# Patient Record
Sex: FEMALE | Race: WHITE | NOT HISPANIC OR LATINO | Employment: OTHER | URBAN - METROPOLITAN AREA
[De-identification: names, ages, dates, MRNs, and addresses within clinical notes are randomized per-mention and may not be internally consistent; named-entity substitution may affect disease eponyms.]

---

## 2017-01-04 ENCOUNTER — OFFICE VISIT (OUTPATIENT)
Dept: NEUROLOGY | Facility: MEDICAL CENTER | Age: 82
End: 2017-01-04
Payer: COMMERCIAL

## 2017-01-04 VITALS
WEIGHT: 130 LBS | BODY MASS INDEX: 24.55 KG/M2 | SYSTOLIC BLOOD PRESSURE: 142 MMHG | OXYGEN SATURATION: 92 % | HEART RATE: 84 BPM | TEMPERATURE: 98.1 F | DIASTOLIC BLOOD PRESSURE: 70 MMHG | HEIGHT: 61 IN

## 2017-01-04 DIAGNOSIS — G45.8 OTHER SPECIFIED TRANSIENT CEREBRAL ISCHEMIAS: ICD-10-CM

## 2017-01-04 PROCEDURE — G8427 DOCREV CUR MEDS BY ELIG CLIN: HCPCS

## 2017-01-04 PROCEDURE — 1036F TOBACCO NON-USER: CPT

## 2017-01-04 PROCEDURE — 99204 OFFICE O/P NEW MOD 45 MIN: CPT

## 2017-01-04 PROCEDURE — G8420 CALC BMI NORM PARAMETERS: HCPCS

## 2017-01-04 RX ORDER — PIOGLITAZONEHYDROCHLORIDE 30 MG/1
30 TABLET ORAL DAILY
COMMUNITY
End: 2017-07-28

## 2017-01-04 RX ORDER — ALENDRONATE SODIUM 70 MG/1
70 TABLET ORAL
COMMUNITY

## 2017-01-04 RX ORDER — GLIPIZIDE 5 MG/1
5 TABLET ORAL EVERY MORNING
COMMUNITY

## 2017-01-04 RX ORDER — CARVEDILOL 3.12 MG/1
3.12 TABLET ORAL 2 TIMES DAILY WITH MEALS
COMMUNITY
End: 2017-07-28

## 2017-01-04 RX ORDER — PRAVASTATIN SODIUM 40 MG
40 TABLET ORAL NIGHTLY
COMMUNITY

## 2017-01-04 NOTE — PROGRESS NOTES
Neurology Consult Note  1/4/2017      Referring MD:  Dr. Mark Sommers    Patient ID:  Name:             Deanna Ram   YOB: 1931  Age:                 85 y.o.  female   MRN:               [id number]                                              Reason for Consult:      Evaluation of a possible transient ischemic attack    History of Present Illness:    This 85-year-old lady had an episode perhaps 4 years ago in which she seemed to be confused and lasted for an hour or so and she may have had some difficulty speaking during that period of time. She did have a complete workup in a local facility and was found to have approximately 70% stenosis in the right carotid less than 50% on the left and had brain imaging study of some sort possibly CAT scan or an MRI. She had been on 81 mg aspirin prior to that and the dose was increased to a full aspirin sometime after that event. In June 2016 she had another event that was relatively brief and apparently in the same nature with brief period of confusion possibly some difficulty speaking which cleared spontaneously and was not apparently associated with any focal weakness. She apparently functioned quite well at home except for back pain and left leg pain from prior back surgery. She does admit that she gets anxious from time to time but she manages their bills and drive safely and limited areas and is able to fix meals. Current medications include Fosamax metformin Glucotrol carvedilol pravastatin and Actos and Levaquin for presumed urinary tract infection at some point since she is on Pyridium as well. Since the event in June of last year there been no further events. [facility] and whether it included an MRA of the vessels is not clear. She was into the swimming in a swimming program but when they moved to another area she hasn't participated in that has regularly.    Review of Systems: Essentially relates to these 2 episodes  of confusion.  Constitutional: Denies fevers, Denies weight changes  Eyes: Denies changes in vision, no eye pain  Ears/Nose/Throat/Mouth: Denies nasal congestion or sore throat   Cardiovascular: Denies chest pain, Denies palpitations   Respiratory: Denies shortness of breath , Denies cough  Gastrointestinal/Hepatic: Denies abdominal pain, nausea, vomiting, diarrhea, constipation or GI bleeding   Genitourinary: Denies dysuria or frequency  Musculoskeletal/Rheum: Denies  joint pain and swelling, No edema  Skin: Denies rash  Neurological: Denies headache, confusion, memory loss or focal weakness/parasthesias  Psychiatric: denies mood disorder   Endocrine: Ernestine thyroid problems  Heme/Oncology/Lymph Nodes: Denies enlarged lymph nodes, denies brusing or known bleeding disorder  All other systems were reviewed and are negative (AMA/CMS criteria)                Past Medical History:     Past Medical History   Diagnosis Date   • Hypertension    • Type II or unspecified type diabetes mellitus without mention of complication, not stated as uncontrolled    • Hypercholesteremia    • Poor historian        Problems addressed this visit:    Problem List Items Addressed This Visit     None      Visit Diagnoses     Other specified transient cerebral ischemias         Relevant Medications     carvedilol (COREG) 3.125 MG Tab     pravastatin (PRAVACHOL) 40 MG tablet           Past Surgical History:   No past surgical history on file.      Current Outpatient Medications:  Current Outpatient Prescriptions   Medication   • alendronate (FOSAMAX) 70 MG Tab   • metformin (GLUCOPHAGE) 1000 MG tablet   • glipiZIDE (GLUCOTROL) 5 MG Tab   • carvedilol (COREG) 3.125 MG Tab   • pravastatin (PRAVACHOL) 40 MG tablet   • aspirin EC (ECOTRIN) 81 MG Tablet Delayed Response   • pioglitazone (ACTOS) 30 MG Tab   • levofloxacin (LEVAQUIN) 500 MG tablet   • Non Formulary Request   • phenazopyridine (PYRIDIUM) 200 MG TABS     No current  "facility-administered medications for this visit.       Medication Allergy:  Allergies   Allergen Reactions   • Codeine Nausea       Family History:  No family history on file.    Social History:  Social History     Social History   • Marital Status:      Spouse Name: N/A   • Number of Children: N/A   • Years of Education: N/A     Occupational History   • Not on file.     Social History Main Topics   • Smoking status: Former Smoker   • Smokeless tobacco: Not on file   • Alcohol Use: Yes      Comment: rare   • Drug Use: No   • Sexual Activity: Not on file     Other Topics Concern   • Not on file     Social History Narrative   • No narrative on file       Physical Exam: He is well-developed well-nourished elderly lady. She has no problem getting out of a chair probably due to her back pain and she says the pain radiates to her left leg. Her gait is otherwise normal sleep for an antalgic favoring the left she'll feels are full and extraocular movements are intact and there is no facial weakness. Language function appears to be perfectly normal as does her cognition. Coordination finger-nose heel-to-shin testing is done well. There are no sensory deficits to light touch pinprick and double simultaneous tactile stimulation. Reflexes are present at the knees were hard to read At the ankles and absent at the left ankle upper. Upper extremity reflexes and strength appear to be symmetric.  Vitals:   Filed Vitals:    01/04/17 1121   BP: 142/70   Pulse: 84   Temp: 36.7 °C (98.1 °F)   Height: 1.549 m (5' 1\")   Weight: 58.968 kg (130 lb)   SpO2: 92%     General Appearance:   Weight/BMI: Body mass index is 24.58 kg/(m^2).    Neurologic Exam:   AOx3: Normal  Recent & remote memory intact: Yes  Attentive with normal coordination: Yes  Normal spontaneous speech pattern: Yes  Age appropriate fund of knowledge: Yes  Cranial nerve II intact: Normal  Cranial nerve III, IV & VI intact: Normal  Cranial nerve V intact: " Normal  Cranial nerve VIII intact: Normal  Cranial nerve IX intact: Normal  Cranial nerve XI intact: Normal  Cranial nerve XII intact: Normal  No sensory deficits: Normal  DTRs intact & symmetrical: Normal   No dysdiadochokinesia or dysmetria: Normal    Eyes:  Normal optic discs: Yes  Visual field: Normal  Pupillary responses: Normal  Extraocular movement: Normal    Cardiovascular:  Normal carotid pulses bilaterally: Yes  RRR with no MRGs: Yes  No peripheral edema, pulses intact: Yes    Musculoskeletal:  Normal gait and station: Yes  Normal muscle strength: Yes  Normal muscle tone, no atrophy: Yes  No abnormal movements: Yes    Plan:   This is not clear whether these 2 very isolated episodes were anxiety related or whether there are some sort of transient global amnesia but I don't get the impression that there was a focal component in terms of neurologic deficit with them. She continues on full strength aspirin I encouraged her to continue with that and to initiate an exercise program with the [facility] where they now reside. I'll see her again in late February and she will bring with her a CD of her MRI from the [facility] and other records such that she might have from the [facility].    Total length of time of this visit was 40 minutes of which greater than 50% was spent counseling the patient/family and coordinating care.    Thank you for allowing me to participate in his care.    Sincerely yours,        Leno Gil MD, PhD

## 2017-01-04 NOTE — MR AVS SNAPSHOT
"Deanna Ram   2017 11:20 AM   Office Visit   MRN: [id number]    Department:  [facility]   Dept Phone:  [phone]    Description:  Female : 1931   Provider:  Leno Gil M.D.           Reason for Visit     New Patient Transient Cerebral Ischemic Attack      Allergies as of 2017     Allergen Noted Reactions    Codeine 2015   Nausea      You were diagnosed with     Other specified transient cerebral ischemias   [435.8.ICD-9-CM]         Vital Signs     Blood Pressure Pulse Temperature Height Weight Body Mass Index    142/70 mmHg 84 36.7 °C (98.1 °F) 1.549 m (5' 1\") 58.968 kg (130 lb) 24.58 kg/m2    Oxygen Saturation Smoking Status                92% Former Smoker          Basic Information     Date Of Birth Sex Race Ethnicity Preferred Language    1931 Female White Non- English      Health Maintenance     Patient has no pending health maintenance at this time      Current Immunizations     No immunizations on file.      Below and/or attached are the medications your provider expects you to take. Review all of your home medications and newly ordered medications with your provider and/or pharmacist. Follow medication instructions as directed by your provider and/or pharmacist. Please keep your medication list with you and share with your provider. Update the information when medications are discontinued, doses are changed, or new medications (including over-the-counter products) are added; and carry medication information at all times in the event of emergency situations     Allergies:  CODEINE - Nausea               Medications  Valid as of: 2017 - 11:57 AM    Generic Name Brand Name Tablet Size Instructions for use    Alendronate Sodium (Tab) FOSAMAX 70 MG Take 70 mg by mouth every 7 days.        Aspirin (Tablet Delayed Response) ECOTRIN 81 MG Take 81 mg by mouth every day.        Carvedilol (Tab) COREG 3.125 MG Take 3.125 mg by mouth 2 times a " day, with meals.        GlipiZIDE (Tab) GLUCOTROL 5 MG Take 5 mg by mouth 2 times a day.        LevoFLOXacin (Tab) LEVAQUIN 500 MG Take 1 Tab by mouth every day.        MetFORMIN HCl (Tab) GLUCOPHAGE 1000 MG Take 1,000 mg by mouth 2 times a day, with meals.        Non Formulary Request Non Formulary Request  Patient unsure of names of drugs. Takes oral meds for diabetes, htn, cholesterol.        Phenazopyridine HCl (Tab) PYRIDIUM 200 MG Take 1 Tab by mouth 3 times a day as needed.        Pioglitazone HCl (Tab) ACTOS 30 MG Take 30 mg by mouth every day.        Pravastatin Sodium (Tab) PRAVACHOL 40 MG Take 40 mg by mouth every evening.        .                 Medicines prescribed today were sent to:     None      Medication refill instructions:       If your prescription bottle indicates you have medication refills left, it is not necessary to call your provider’s office. Please contact your pharmacy and they will refill your medication.    If your prescription bottle indicates you do not have any refills left, you may request refills at any time through one of the following ways: The online [employer] system (except Urgent Care), by calling your provider’s office, or by asking your pharmacy to contact your provider’s office with a refill request. Medication refills are processed only during regular business hours and may not be available until the next business day. Your provider may request additional information or to have a follow-up visit with you prior to refilling your medication.   *Please Note: Medication refills are assigned a new Rx number when refilled electronically. Your pharmacy may indicate that no refills were authorized even though a new prescription for the same medication is available at the pharmacy. Please request the medicine by name with the pharmacy before contacting your provider for a refill.           [employer] Access Code: [email or web address]  Expires: 2/3/2017 10:52 AM    [employer]  A secure,  online tool to manage your health information     [employer]® is a secure, online tool that connects you to your personalized health information from the privacy of your home -- day or night - making it very easy for you to manage your healthcare. Once the activation process is completed, you can even access your medical information using the [employer] odessa, which is available for free in the Apple Odessa store or Google Play store.     [employer] provides the following levels of access (as shown below):   My Chart Features   [facility] Doctor [facility]  Specialists [facility]  Urgent  Care Non-[facility]  Primary Care  Doctor   Email your healthcare team securely and privately 24/7 X X X    Manage appointments: schedule your next appointment; view details of past/upcoming appointments X      Request prescription refills. X      View recent personal medical records, including lab and immunizations X X X X   View health record, including health history, allergies, medications X X X X   Read reports about your outpatient visits, procedures, consult and ER notes X X X X   See your discharge summary, which is a recap of your hospital and/or ER visit that includes your diagnosis, lab results, and care plan. X X       How to register for [employer]:  1. Go to  [email or web address].  2. Click on the Sign Up Now box, which takes you to the New Member Sign Up page. You will need to provide the following information:  a. Enter your [employer] Access Code exactly as it appears at the top of this page. (You will not need to use this code after you’ve completed the sign-up process. If you do not sign up before the expiration date, you must request a new code.)   b. Enter your date of birth.   c. Enter your home email address.   d. Click Submit, and follow the next screen’s instructions.  3. Create a [employer] ID. This will be your [employer] login ID and cannot be changed, so think of one that is secure and easy to  remember.  4. Create a [employer] password. You can change your password at any time.  5. Enter your Password Reset Question and Answer. This can be used at a later time if you forget your password.   6. Enter your e-mail address. This allows you to receive e-mail notifications when new information is available in [employer].  7. Click Sign Up. You can now view your health information.    For assistance activating your [employer] account, call [phone]

## 2017-02-22 ENCOUNTER — OFFICE VISIT (OUTPATIENT)
Dept: NEUROLOGY | Facility: MEDICAL CENTER | Age: 82
End: 2017-02-22
Payer: COMMERCIAL

## 2017-02-22 VITALS
DIASTOLIC BLOOD PRESSURE: 58 MMHG | HEIGHT: 61 IN | RESPIRATION RATE: 18 BRPM | SYSTOLIC BLOOD PRESSURE: 140 MMHG | TEMPERATURE: 98.8 F | WEIGHT: 131.8 LBS | BODY MASS INDEX: 24.88 KG/M2 | HEART RATE: 82 BPM | OXYGEN SATURATION: 90 %

## 2017-02-22 DIAGNOSIS — G45.8 OTHER SPECIFIED TRANSIENT CEREBRAL ISCHEMIAS: ICD-10-CM

## 2017-02-22 PROCEDURE — G8420 CALC BMI NORM PARAMETERS: HCPCS

## 2017-02-22 PROCEDURE — 4040F PNEUMOC VAC/ADMIN/RCVD: CPT | Mod: 8P

## 2017-02-22 PROCEDURE — G8484 FLU IMMUNIZE NO ADMIN: HCPCS

## 2017-02-22 PROCEDURE — 99213 OFFICE O/P EST LOW 20 MIN: CPT

## 2017-02-22 PROCEDURE — 1101F PT FALLS ASSESS-DOCD LE1/YR: CPT | Mod: 8P

## 2017-02-22 PROCEDURE — G8432 DEP SCR NOT DOC, RNG: HCPCS

## 2017-02-22 PROCEDURE — 1036F TOBACCO NON-USER: CPT

## 2017-02-22 NOTE — PROGRESS NOTES
Neurology Progress Note  2/22/2017      Referring MD:  Mark Marshall  Patient ID:  Name:             Deanna Ram   YOB: 1931  Age:                 85 y.o.  female   MRN:               [id number]                                              Reason for Consult:      Follow-up of possible transient ischemic attack.    History of Present Illness:    This 85-year-old lady with type 2 diabetes had 2 events based about a week or 2 apart the last 6 months ago where she apparently had some confusion and perhaps some speech difficulty. She had been on baby aspirin and at the last visit her aspirin dose was up to 325 mg and she's had no further events.. [facility] showing only atrophy a carotid Doppler study showing 50% stenosis on one side the left side and 50-70% on the right side. She also had an echocardiogram transthoracic that was normal except for pulmonary hypertension. Laboratory studies that were available there were all essentially normal except for glucose.    Review of Systems: Relates to these 2 episodes.  Constitutional: Denies fevers, Denies weight changes  Eyes: Denies changes in vision, no eye pain  Ears/Nose/Throat/Mouth: Denies nasal congestion or sore throat   Cardiovascular: Denies chest pain, Denies palpitations   Respiratory: Denies shortness of breath , Denies cough  Gastrointestinal/Hepatic: Denies abdominal pain, nausea, vomiting, diarrhea, constipation or GI bleeding   Genitourinary: Denies dysuria or frequency  Musculoskeletal/Rheum: Denies  joint pain and swelling, No edema  Skin: Denies rash  Neurological: Denies headache, confusion, memory loss or focal weakness/parasthesias  Psychiatric: denies mood disorder   Endocrine: Ernestine thyroid problems  Heme/Oncology/Lymph Nodes: Denies enlarged lymph nodes, denies brusing or known bleeding disorder  All other systems were reviewed and are negative (AMA/CMS criteria)   "              Past Medical History:     Past Medical History   Diagnosis Date   • Hypertension    • Type II or unspecified type diabetes mellitus without mention of complication, not stated as uncontrolled    • Hypercholesteremia    • Poor historian        Problems addressed this visit:    Problem List Items Addressed This Visit     None      Visit Diagnoses     Other specified transient cerebral ischemias               Past Surgical History:   No past surgical history on file.      Current Outpatient Medications:  Current Outpatient Prescriptions   Medication   • alendronate (FOSAMAX) 70 MG Tab   • metformin (GLUCOPHAGE) 1000 MG tablet   • carvedilol (COREG) 3.125 MG Tab   • pravastatin (PRAVACHOL) 40 MG tablet   • aspirin EC (ECOTRIN) 81 MG Tablet Delayed Response   • phenazopyridine (PYRIDIUM) 200 MG TABS   • glipiZIDE (GLUCOTROL) 5 MG Tab   • pioglitazone (ACTOS) 30 MG Tab   • Non Formulary Request   • levofloxacin (LEVAQUIN) 500 MG tablet     No current facility-administered medications for this visit.       Medication Allergy:  Allergies   Allergen Reactions   • Codeine Nausea       Family History:  No family history on file.    Social History:  Social History     Social History   • Marital Status:      Spouse Name: N/A   • Number of Children: N/A   • Years of Education: N/A     Occupational History   • Not on file.     Social History Main Topics   • Smoking status: Former Smoker   • Smokeless tobacco: Not on file   • Alcohol Use: Yes      Comment: rare   • Drug Use: No   • Sexual Activity: Not on file     Other Topics Concern   • Not on file     Social History Narrative   • No narrative on file       Physical Exam:  Vitals:   Filed Vitals:    02/22/17 1122   BP: 140/58   Pulse: 82   Temp: 37.1 °C (98.8 °F)   Resp: 18   Height: 1.549 m (5' 0.98\")   Weight: 59.784 kg (131 lb 12.8 oz)   SpO2: 90%     General Appearance: She ambulates with a wheeled walker because of prior back pain. Her voice volume is " good cognition appears to be good and visual fields are full and equal. There is decrease in excess tibia and lower portions of the lower extremities but otherwise no sensory deficits were seen.  Weight/BMI: Body mass index is 24.92 kg/(m^2).      Eyes:  Normal optic discs: Yes  Visual field: Normal  Pupillary responses: Normal  Extraocular movement: Normal    Cardiovascular:  Normal carotid pulses bilaterally: Yes  RRR with no MRGs: Yes  No peripheral edema, pulses intact: Yes    Musculoskeletal:  Normal gait and station: No uses a wheeled walker.  Normal muscle strength: Yes  Normal muscle tone, no atrophy: Yes  No abnormal movements: yes    Plan:   It may have been that some of these episodes were hypoglycemic. If in fact they were vascular events given the normal transthoracic echo and normal are essentially normal carotids she wouldn't be a candidate for carotid surgery and we are now pretty much out of that. Her risk for recurrent vascular event. I would like her to continue on higher strength aspirin. I'll see her again as needed.    Total length of time of this visit was 20 minutes of which greater than 50% was spent counseling the patient/family and coordinating care.    Thank you for allowing me to participate in his care.    Sincerely yours,        Leno Gil MD, PhD

## 2017-02-22 NOTE — MR AVS SNAPSHOT
"        Deanna Jarvis Yo   2017 11:40 AM   Office Visit   MRN: [id number]    Department:  [facility]   Dept Phone:  [phone]    Description:  Female : 1931   Provider:  Leno Gil M.D.           Reason for Visit     Follow-Up TIA      Allergies as of 2017     Allergen Noted Reactions    Codeine 2015   Nausea      You were diagnosed with     Other specified transient cerebral ischemias   [435.8.ICD-9-CM]         Vital Signs     Blood Pressure Pulse Temperature Respirations Height Weight    140/58 mmHg 82 37.1 °C (98.8 °F) 18 1.549 m (5' 0.98\") 59.784 kg (131 lb 12.8 oz)    Body Mass Index Oxygen Saturation Smoking Status             24.92 kg/m2 90% Former Smoker         Basic Information     Date Of Birth Sex Race Ethnicity Preferred Language    1931 Female White Non- English      Health Maintenance        Date Due Completion Dates    IMM DTaP/Tdap/Td Vaccine (1 - Tdap) 1950 ---    PAP SMEAR 1952 ---    COLONOSCOPY 1981 ---    IMM ZOSTER VACCINE 1991 ---    BONE DENSITY 1996 ---    IMM PNEUMOCOCCAL 65+ (ADULT) LOW/MEDIUM RISK SERIES (1 of 2 - PCV13) 1996 ---    MAMMOGRAM 2010, 2009    IMM INFLUENZA (1) 2016 ---            Current Immunizations     No immunizations on file.      Below and/or attached are the medications your provider expects you to take. Review all of your home medications and newly ordered medications with your provider and/or pharmacist. Follow medication instructions as directed by your provider and/or pharmacist. Please keep your medication list with you and share with your provider. Update the information when medications are discontinued, doses are changed, or new medications (including over-the-counter products) are added; and carry medication information at all times in the event of emergency situations     Allergies:  CODEINE - Nausea               Medications  Valid as of: February " 22, 2017 - 12:55 PM    Generic Name Brand Name Tablet Size Instructions for use    Alendronate Sodium (Tab) FOSAMAX 70 MG Take 70 mg by mouth every 7 days.        Aspirin (Tablet Delayed Response) ECOTRIN 81 MG Take 81 mg by mouth every day.        Carvedilol (Tab) COREG 3.125 MG Take 3.125 mg by mouth 2 times a day, with meals.        GlipiZIDE (Tab) GLUCOTROL 5 MG Take 5 mg by mouth 2 times a day.        LevoFLOXacin (Tab) LEVAQUIN 500 MG Take 1 Tab by mouth every day.        MetFORMIN HCl (Tab) GLUCOPHAGE 1000 MG Take 1,000 mg by mouth 2 times a day, with meals.        Non Formulary Request Non Formulary Request  Patient unsure of names of drugs. Takes oral meds for diabetes, htn, cholesterol.        Phenazopyridine HCl (Tab) PYRIDIUM 200 MG Take 1 Tab by mouth 3 times a day as needed.        Pioglitazone HCl (Tab) ACTOS 30 MG Take 30 mg by mouth every day.        Pravastatin Sodium (Tab) PRAVACHOL 40 MG Take 40 mg by mouth every evening.        .                 Medicines prescribed today were sent to:     None      Medication refill instructions:       If your prescription bottle indicates you have medication refills left, it is not necessary to call your provider’s office. Please contact your pharmacy and they will refill your medication.    If your prescription bottle indicates you do not have any refills left, you may request refills at any time through one of the following ways: The online [employer] system (except Urgent Care), by calling your provider’s office, or by asking your pharmacy to contact your provider’s office with a refill request. Medication refills are processed only during regular business hours and may not be available until the next business day. Your provider may request additional information or to have a follow-up visit with you prior to refilling your medication.   *Please Note: Medication refills are assigned a new Rx number when refilled electronically. Your pharmacy may indicate that  no refills were authorized even though a new prescription for the same medication is available at the pharmacy. Please request the medicine by name with the pharmacy before contacting your provider for a refill.           [facility] Access Code: [email or web address]  Expires: 3/24/2017 12:55 PM    [facility]  A secure, online tool to manage your health information     [employer]’s [facility]® is a secure, online tool that connects you to your personalized health information from the privacy of your home -- day or night - making it very easy for you to manage your healthcare. Once the activation process is completed, you can even access your medical information using the [facility] odessa, which is available for free in the Apple Odessa store or Google Play store.     [facility] provides the following levels of access (as shown below):   My Chart Features   [facility] Primary [facility] Doctor [facility]  Specialists [facility]  Urgent  Care Non-[facility]  Primary Care  Doctor   Email your healthcare team securely and privately 24/7 X X X    Manage appointments: schedule your next appointment; view details of past/upcoming appointments X      Request prescription refills. X      View recent personal medical records, including lab and immunizations X X X X   View health record, including health history, allergies, medications X X X X   Read reports about your outpatient visits, procedures, consult and ER notes X X X X   See your discharge summary, which is a recap of your hospital and/or ER visit that includes your diagnosis, lab results, and care plan. X X       How to register for [facility]:  1. Go to  [email or web address].  2. Click on the Sign Up Now box, which takes you to the New Member Sign Up page. You will need to provide the following information:  a. Enter your [facility] Access Code exactly as it appears at the top of this page. (You will not need to use this code after you’ve completed the sign-up process. If you do not sign up before the  expiration date, you must request a new code.)   b. Enter your date of birth.   c. Enter your home email address.   d. Click Submit, and follow the next screen’s instructions.  3. Create a [employer] ID. This will be your [employer] login ID and cannot be changed, so think of one that is secure and easy to remember.  4. Create a [employer] password. You can change your password at any time.  5. Enter your Password Reset Question and Answer. This can be used at a later time if you forget your password.   6. Enter your e-mail address. This allows you to receive e-mail notifications when new information is available in [employer].  7. Click Sign Up. You can now view your health information.    For assistance activating your [employer] account, call [phone]

## 2017-07-28 ENCOUNTER — RESOLUTE PROFESSIONAL BILLING HOSPITAL PROF FEE (OUTPATIENT)
Dept: HOSPITALIST | Facility: MEDICAL CENTER | Age: 82
End: 2017-07-28
Payer: COMMERCIAL

## 2017-07-28 ENCOUNTER — APPOINTMENT (OUTPATIENT)
Dept: RADIOLOGY | Facility: MEDICAL CENTER | Age: 82
DRG: 492 | End: 2017-07-28
Attending: EMERGENCY MEDICINE
Payer: COMMERCIAL

## 2017-07-28 ENCOUNTER — HOSPITAL ENCOUNTER (OUTPATIENT)
Dept: RADIOLOGY | Facility: MEDICAL CENTER | Age: 82
End: 2017-07-28

## 2017-07-28 ENCOUNTER — HOSPITAL ENCOUNTER (INPATIENT)
Facility: MEDICAL CENTER | Age: 82
LOS: 4 days | DRG: 492 | End: 2017-08-01
Attending: EMERGENCY MEDICINE | Admitting: HOSPITALIST
Payer: COMMERCIAL

## 2017-07-28 DIAGNOSIS — N30.00 ACUTE CYSTITIS WITHOUT HEMATURIA: ICD-10-CM

## 2017-07-28 DIAGNOSIS — S82.852A TRIMALLEOLAR FRACTURE, LEFT, CLOSED, INITIAL ENCOUNTER: ICD-10-CM

## 2017-07-28 DIAGNOSIS — S82.892A ANKLE FRACTURE, LEFT, CLOSED, INITIAL ENCOUNTER: ICD-10-CM

## 2017-07-28 DIAGNOSIS — I48.21 PERMANENT ATRIAL FIBRILLATION (HCC): ICD-10-CM

## 2017-07-28 PROBLEM — I48.20 CHRONIC ATRIAL FIBRILLATION (HCC): Status: ACTIVE | Noted: 2017-07-28

## 2017-07-28 PROBLEM — S82.853A TRIMALLEOLAR FRACTURE: Status: ACTIVE | Noted: 2017-07-28

## 2017-07-28 PROBLEM — E11.9 TYPE II DIABETES MELLITUS (HCC): Status: ACTIVE | Noted: 2017-07-28

## 2017-07-28 PROBLEM — I10 HYPERTENSION: Status: ACTIVE | Noted: 2017-07-28

## 2017-07-28 PROBLEM — N39.0 UTI (URINARY TRACT INFECTION): Status: ACTIVE | Noted: 2017-07-28

## 2017-07-28 PROBLEM — E78.5 HYPERLIPIDEMIA: Status: ACTIVE | Noted: 2017-07-28

## 2017-07-28 PROBLEM — M81.0 OSTEOPOROSIS: Status: ACTIVE | Noted: 2017-07-28

## 2017-07-28 LAB
ALBUMIN SERPL BCP-MCNC: 3.4 G/DL (ref 3.2–4.9)
ALBUMIN/GLOB SERPL: 1 G/DL
ALP SERPL-CCNC: 51 U/L (ref 30–99)
ALT SERPL-CCNC: 15 U/L (ref 2–50)
ANION GAP SERPL CALC-SCNC: 10 MMOL/L (ref 0–11.9)
APTT PPP: 34.9 SEC (ref 24.7–36)
AST SERPL-CCNC: 16 U/L (ref 12–45)
BASOPHILS # BLD AUTO: 0.3 % (ref 0–1.8)
BASOPHILS # BLD: 0.03 K/UL (ref 0–0.12)
BILIRUB SERPL-MCNC: 0.3 MG/DL (ref 0.1–1.5)
BNP SERPL-MCNC: 195 PG/ML (ref 0–100)
BUN SERPL-MCNC: 14 MG/DL (ref 8–22)
CALCIUM SERPL-MCNC: 8.6 MG/DL (ref 8.5–10.5)
CHLORIDE SERPL-SCNC: 89 MMOL/L (ref 96–112)
CO2 SERPL-SCNC: 22 MMOL/L (ref 20–33)
CREAT SERPL-MCNC: 0.97 MG/DL (ref 0.5–1.4)
EKG IMPRESSION: NORMAL
EOSINOPHIL # BLD AUTO: 0.12 K/UL (ref 0–0.51)
EOSINOPHIL NFR BLD: 1.2 % (ref 0–6.9)
ERYTHROCYTE [DISTWIDTH] IN BLOOD BY AUTOMATED COUNT: 45.7 FL (ref 35.9–50)
GFR SERPL CREATININE-BSD FRML MDRD: 54 ML/MIN/1.73 M 2
GLOBULIN SER CALC-MCNC: 3.3 G/DL (ref 1.9–3.5)
GLUCOSE BLD-MCNC: 182 MG/DL (ref 65–99)
GLUCOSE SERPL-MCNC: 151 MG/DL (ref 65–99)
HCT VFR BLD AUTO: 35 % (ref 37–47)
HGB BLD-MCNC: 12.1 G/DL (ref 12–16)
IMM GRANULOCYTES # BLD AUTO: 0.07 K/UL (ref 0–0.11)
IMM GRANULOCYTES NFR BLD AUTO: 0.7 % (ref 0–0.9)
INR PPP: 1 (ref 0.87–1.13)
LIPASE SERPL-CCNC: 24 U/L (ref 11–82)
LYMPHOCYTES # BLD AUTO: 1.3 K/UL (ref 1–4.8)
LYMPHOCYTES NFR BLD: 12.6 % (ref 22–41)
MCH RBC QN AUTO: 29 PG (ref 27–33)
MCHC RBC AUTO-ENTMCNC: 34.6 G/DL (ref 33.6–35)
MCV RBC AUTO: 83.9 FL (ref 81.4–97.8)
MONOCYTES # BLD AUTO: 1.13 K/UL (ref 0–0.85)
MONOCYTES NFR BLD AUTO: 10.9 % (ref 0–13.4)
NEUTROPHILS # BLD AUTO: 7.69 K/UL (ref 2–7.15)
NEUTROPHILS NFR BLD: 74.3 % (ref 44–72)
NRBC # BLD AUTO: 0 K/UL
NRBC BLD AUTO-RTO: 0 /100 WBC
PLATELET # BLD AUTO: 371 K/UL (ref 164–446)
PMV BLD AUTO: 8.8 FL (ref 9–12.9)
POTASSIUM SERPL-SCNC: 3.8 MMOL/L (ref 3.6–5.5)
PROT SERPL-MCNC: 6.7 G/DL (ref 6–8.2)
PROTHROMBIN TIME: 13.5 SEC (ref 12–14.6)
RBC # BLD AUTO: 4.17 M/UL (ref 4.2–5.4)
SODIUM SERPL-SCNC: 121 MMOL/L (ref 135–145)
TROPONIN I SERPL-MCNC: <0.01 NG/ML (ref 0–0.04)
WBC # BLD AUTO: 10.3 K/UL (ref 4.8–10.8)

## 2017-07-28 PROCEDURE — 36415 COLL VENOUS BLD VENIPUNCTURE: CPT

## 2017-07-28 PROCEDURE — 770006 HCHG ROOM/CARE - MED/SURG/GYN SEMI*

## 2017-07-28 PROCEDURE — 700102 HCHG RX REV CODE 250 W/ 637 OVERRIDE(OP): Performed by: HOSPITALIST

## 2017-07-28 PROCEDURE — 700111 HCHG RX REV CODE 636 W/ 250 OVERRIDE (IP): Performed by: EMERGENCY MEDICINE

## 2017-07-28 PROCEDURE — 84484 ASSAY OF TROPONIN QUANT: CPT

## 2017-07-28 PROCEDURE — 80053 COMPREHEN METABOLIC PANEL: CPT

## 2017-07-28 PROCEDURE — 99285 EMERGENCY DEPT VISIT HI MDM: CPT

## 2017-07-28 PROCEDURE — 83690 ASSAY OF LIPASE: CPT

## 2017-07-28 PROCEDURE — 82962 GLUCOSE BLOOD TEST: CPT

## 2017-07-28 PROCEDURE — 96365 THER/PROPH/DIAG IV INF INIT: CPT

## 2017-07-28 PROCEDURE — 85730 THROMBOPLASTIN TIME PARTIAL: CPT

## 2017-07-28 PROCEDURE — 700111 HCHG RX REV CODE 636 W/ 250 OVERRIDE (IP): Performed by: HOSPITALIST

## 2017-07-28 PROCEDURE — 83880 ASSAY OF NATRIURETIC PEPTIDE: CPT

## 2017-07-28 PROCEDURE — 93005 ELECTROCARDIOGRAM TRACING: CPT | Performed by: EMERGENCY MEDICINE

## 2017-07-28 PROCEDURE — 85025 COMPLETE CBC W/AUTO DIFF WBC: CPT

## 2017-07-28 PROCEDURE — 700105 HCHG RX REV CODE 258: Performed by: HOSPITALIST

## 2017-07-28 PROCEDURE — 85610 PROTHROMBIN TIME: CPT

## 2017-07-28 PROCEDURE — 700101 HCHG RX REV CODE 250: Performed by: HOSPITALIST

## 2017-07-28 PROCEDURE — 71010 DX-CHEST-PORTABLE (1 VIEW): CPT

## 2017-07-28 PROCEDURE — 96375 TX/PRO/DX INJ NEW DRUG ADDON: CPT

## 2017-07-28 PROCEDURE — A9270 NON-COVERED ITEM OR SERVICE: HCPCS | Performed by: HOSPITALIST

## 2017-07-28 RX ORDER — HYDROMORPHONE HYDROCHLORIDE 2 MG/1
1-4 TABLET ORAL EVERY 6 HOURS PRN
Status: DISCONTINUED | OUTPATIENT
Start: 2017-07-28 | End: 2017-07-28

## 2017-07-28 RX ORDER — ASPIRIN 81 MG/1
81 TABLET, CHEWABLE ORAL EVERY MORNING
COMMUNITY

## 2017-07-28 RX ORDER — PRAVASTATIN SODIUM 20 MG
40 TABLET ORAL NIGHTLY
Status: DISCONTINUED | OUTPATIENT
Start: 2017-07-28 | End: 2017-08-01 | Stop reason: HOSPADM

## 2017-07-28 RX ORDER — BISACODYL 10 MG
10 SUPPOSITORY, RECTAL RECTAL
Status: DISCONTINUED | OUTPATIENT
Start: 2017-07-28 | End: 2017-07-30

## 2017-07-28 RX ORDER — LORAZEPAM 2 MG/ML
0.5 INJECTION INTRAMUSCULAR EVERY 6 HOURS PRN
Status: DISCONTINUED | OUTPATIENT
Start: 2017-07-28 | End: 2017-08-01 | Stop reason: HOSPADM

## 2017-07-28 RX ORDER — LORAZEPAM 0.5 MG/1
0.5 TABLET ORAL EVERY 6 HOURS PRN
Status: DISCONTINUED | OUTPATIENT
Start: 2017-07-28 | End: 2017-08-01 | Stop reason: HOSPADM

## 2017-07-28 RX ORDER — ONDANSETRON 2 MG/ML
4 INJECTION INTRAMUSCULAR; INTRAVENOUS ONCE
Status: COMPLETED | OUTPATIENT
Start: 2017-07-28 | End: 2017-07-28

## 2017-07-28 RX ORDER — AMOXICILLIN 250 MG
2 CAPSULE ORAL 2 TIMES DAILY
Status: DISCONTINUED | OUTPATIENT
Start: 2017-07-28 | End: 2017-07-30

## 2017-07-28 RX ORDER — HYDROMORPHONE HYDROCHLORIDE 2 MG/1
1-2 TABLET ORAL EVERY 6 HOURS PRN
Status: DISCONTINUED | OUTPATIENT
Start: 2017-07-28 | End: 2017-08-01 | Stop reason: HOSPADM

## 2017-07-28 RX ORDER — METOPROLOL SUCCINATE 50 MG/1
25 TABLET, EXTENDED RELEASE ORAL EVERY MORNING
COMMUNITY

## 2017-07-28 RX ORDER — ACETAMINOPHEN 500 MG
500 TABLET ORAL EVERY 6 HOURS PRN
COMMUNITY

## 2017-07-28 RX ORDER — HYDROMORPHONE HYDROCHLORIDE 2 MG/1
2 TABLET ORAL EVERY 6 HOURS PRN
Status: DISCONTINUED | OUTPATIENT
Start: 2017-07-28 | End: 2017-07-28

## 2017-07-28 RX ORDER — SODIUM CHLORIDE 9 MG/ML
INJECTION, SOLUTION INTRAVENOUS CONTINUOUS
Status: DISCONTINUED | OUTPATIENT
Start: 2017-07-28 | End: 2017-07-30

## 2017-07-28 RX ORDER — ASPIRIN 81 MG/1
81 TABLET, CHEWABLE ORAL EVERY MORNING
Status: DISCONTINUED | OUTPATIENT
Start: 2017-07-29 | End: 2017-08-01 | Stop reason: HOSPADM

## 2017-07-28 RX ORDER — POLYETHYLENE GLYCOL 3350 17 G/17G
1 POWDER, FOR SOLUTION ORAL
Status: DISCONTINUED | OUTPATIENT
Start: 2017-07-28 | End: 2017-07-30

## 2017-07-28 RX ORDER — OXYCODONE HYDROCHLORIDE 5 MG/1
5 TABLET ORAL
Status: DISCONTINUED | OUTPATIENT
Start: 2017-07-28 | End: 2017-08-01 | Stop reason: HOSPADM

## 2017-07-28 RX ORDER — HYDROMORPHONE HYDROCHLORIDE 2 MG/1
3-4 TABLET ORAL EVERY 6 HOURS PRN
Status: DISCONTINUED | OUTPATIENT
Start: 2017-07-28 | End: 2017-07-28

## 2017-07-28 RX ORDER — METOPROLOL SUCCINATE 25 MG/1
25 TABLET, EXTENDED RELEASE ORAL EVERY MORNING
Status: DISCONTINUED | OUTPATIENT
Start: 2017-07-28 | End: 2017-07-31

## 2017-07-28 RX ORDER — OXYCODONE HYDROCHLORIDE 10 MG/1
10 TABLET ORAL
Status: DISCONTINUED | OUTPATIENT
Start: 2017-07-28 | End: 2017-08-01 | Stop reason: HOSPADM

## 2017-07-28 RX ORDER — ONDANSETRON 4 MG/1
4 TABLET, ORALLY DISINTEGRATING ORAL EVERY 4 HOURS PRN
Status: DISCONTINUED | OUTPATIENT
Start: 2017-07-28 | End: 2017-08-01 | Stop reason: HOSPADM

## 2017-07-28 RX ORDER — ONDANSETRON 2 MG/ML
4 INJECTION INTRAMUSCULAR; INTRAVENOUS EVERY 4 HOURS PRN
Status: DISCONTINUED | OUTPATIENT
Start: 2017-07-28 | End: 2017-08-01 | Stop reason: HOSPADM

## 2017-07-28 RX ORDER — OXYBUTYNIN CHLORIDE 5 MG/1
2.5 TABLET ORAL 2 TIMES DAILY
Status: DISCONTINUED | OUTPATIENT
Start: 2017-07-28 | End: 2017-08-01 | Stop reason: HOSPADM

## 2017-07-28 RX ORDER — OXYBUTYNIN CHLORIDE 5 MG/1
2.5 TABLET ORAL 2 TIMES DAILY
COMMUNITY

## 2017-07-28 RX ORDER — ONDANSETRON 4 MG/1
4 TABLET, ORALLY DISINTEGRATING ORAL EVERY 8 HOURS PRN
Status: DISCONTINUED | OUTPATIENT
Start: 2017-07-28 | End: 2017-08-01 | Stop reason: HOSPADM

## 2017-07-28 RX ORDER — DEXTROSE MONOHYDRATE 25 G/50ML
25 INJECTION, SOLUTION INTRAVENOUS
Status: DISCONTINUED | OUTPATIENT
Start: 2017-07-28 | End: 2017-08-01 | Stop reason: HOSPADM

## 2017-07-28 RX ORDER — CEFTRIAXONE 1 G/1
1 INJECTION, POWDER, FOR SOLUTION INTRAMUSCULAR; INTRAVENOUS ONCE
Status: COMPLETED | OUTPATIENT
Start: 2017-07-28 | End: 2017-07-28

## 2017-07-28 RX ORDER — HYDROMORPHONE HYDROCHLORIDE 2 MG/1
3-4 TABLET ORAL EVERY 6 HOURS PRN
Status: DISCONTINUED | OUTPATIENT
Start: 2017-07-28 | End: 2017-08-01 | Stop reason: HOSPADM

## 2017-07-28 RX ORDER — PIOGLITAZONEHYDROCHLORIDE 30 MG/1
30 TABLET ORAL EVERY MORNING
COMMUNITY

## 2017-07-28 RX ORDER — LABETALOL HYDROCHLORIDE 5 MG/ML
10 INJECTION, SOLUTION INTRAVENOUS EVERY 4 HOURS PRN
Status: DISCONTINUED | OUTPATIENT
Start: 2017-07-28 | End: 2017-08-01 | Stop reason: HOSPADM

## 2017-07-28 RX ADMIN — SODIUM CHLORIDE: 9 INJECTION, SOLUTION INTRAVENOUS at 21:42

## 2017-07-28 RX ADMIN — LABETALOL HYDROCHLORIDE 10 MG: 5 INJECTION, SOLUTION INTRAVENOUS at 21:41

## 2017-07-28 RX ADMIN — HYDROMORPHONE HYDROCHLORIDE 0.5 MG: 1 INJECTION, SOLUTION INTRAMUSCULAR; INTRAVENOUS; SUBCUTANEOUS at 19:32

## 2017-07-28 RX ADMIN — CEFTRIAXONE SODIUM 1 G: 1 INJECTION, POWDER, FOR SOLUTION INTRAMUSCULAR; INTRAVENOUS at 19:32

## 2017-07-28 RX ADMIN — HYDROMORPHONE HYDROCHLORIDE 2 MG: 2 TABLET ORAL at 22:51

## 2017-07-28 RX ADMIN — INSULIN LISPRO 2 UNITS: 100 INJECTION, SOLUTION INTRAVENOUS; SUBCUTANEOUS at 21:49

## 2017-07-28 RX ADMIN — ONDANSETRON 4 MG: 2 INJECTION INTRAMUSCULAR; INTRAVENOUS at 20:09

## 2017-07-28 RX ADMIN — ONDANSETRON 4 MG: 4 TABLET, ORALLY DISINTEGRATING ORAL at 19:32

## 2017-07-28 RX ADMIN — ONDANSETRON 4 MG: 4 TABLET, ORALLY DISINTEGRATING ORAL at 21:10

## 2017-07-28 ASSESSMENT — ENCOUNTER SYMPTOMS
CHILLS: 0
FALLS: 1
FEVER: 0
SHORTNESS OF BREATH: 0
COUGH: 0

## 2017-07-28 ASSESSMENT — PAIN SCALES - GENERAL
PAINLEVEL_OUTOF10: 6
PAINLEVEL_OUTOF10: 2

## 2017-07-28 NOTE — IP AVS SNAPSHOT
[facility] Access Code: [email or web address]  Expires: 8/26/2017 12:49 PM    [employer]  A secure, online tool to manage your health information     [employer]’s [employer]® is a secure, online tool that connects you to your personalized health information from the privacy of your home -- day or night - making it very easy for you to manage your healthcare. Once the activation process is completed, you can even access your medical information using the [employer] odessa, which is available for free in the [employer] store or Google Play store.     [employer] provides the following levels of access (as shown below):   My Chart Features   [facility] Primary Care Doctor [facility]  Specialists [facility]  Urgent  Care Non-[facility]  Primary Care  Doctor   Email your healthcare team securely and privately 24/7 X X X X   Manage appointments: schedule your next appointment; view details of past/upcoming appointments X      Request prescription refills. X      View recent personal medical records, including lab and immunizations X X X X   View health record, including health history, allergies, medications X X X X   Read reports about your outpatient visits, procedures, consult and ER notes X X X X   See your discharge summary, which is a recap of your hospital and/or ER visit that includes your diagnosis, lab results, and care plan. X X       How to register for [employer]:  1. Go to  [email or web address].  2. Click on the Sign Up Now box, which takes you to the New Member Sign Up page. You will need to provide the following information:  a. Enter your [employer] Access Code exactly as it appears at the top of this page. (You will not need to use this code after you’ve completed the sign-up process. If you do not sign up before the expiration date, you must request a new code.)   b. Enter your date of birth.   c. Enter your home email address.   d. Click Submit, and follow the next screen’s instructions.  3. Create a [employer] ID. This will be your [employer]  login ID and cannot be changed, so think of one that is secure and easy to remember.  4. Create a [employer] password. You can change your password at any time.  5. Enter your Password Reset Question and Answer. This can be used at a later time if you forget your password.   6. Enter your e-mail address. This allows you to receive e-mail notifications when new information is available in [employer].  7. Click Sign Up. You can now view your health information.    For assistance activating your [employer] account, call [phone]

## 2017-07-28 NOTE — IP AVS SNAPSHOT
" Home Care Instructions                                                                                                                  Name:Deanna Ram  Medical Record Number:[id number]  CSN: [id number]    YOB: 1931   Age: 86 y.o.  Sex: female  HT:1.549 m (5' 1\") WT: 59.6 kg (131 lb 6.3 oz)          Admit Date: 7/28/2017     Discharge Date:   Today's Date: 8/1/2017  Attending Doctor:  Papa Mcdermott M.D.                  Allergies:  Morphine and related and Levaquin            Discharge Instructions       Discharge Instructions    Discharged to [facility] by medical transportation with escort. Discharged via wheelchair, hospital escort: Yes.  Special equipment needed: Oxygen     Be sure to schedule a follow-up appointment with your primary care doctor or any specialists as instructed.     Discharge Plan:   Diet Plan: Discussed  Activity Level: Discussed  Confirmed Follow up Appointment: Appointment Scheduled  Confirmed Symptoms Management: Discussed  Medication Reconciliation Updated: Yes  Pneumococcal Vaccine Given - only chart on this line when given: Given (See MAR)  Influenza Vaccine Indication: Not indicated: Previously immunized this influenza season and > 8 years of age    I understand that a diet low in cholesterol, fat, and sodium is recommended for good health. Unless I have been given specific instructions below for another diet, I accept this instruction as my diet prescription.   Other diet: Diabetic, low cholesterol     Special Instructions: Discharge instructions for the Orthopedic Patient    Follow up with Primary Care Physician within 2 weeks of discharge to home, regarding:  Review of medications and diagnostic testing.  Surveillance for medical complications.  Workup and treatment of osteoporosis, if appropriate.     -Is this a Joint Replacement patient? No    -Is this patient being discharged with medication to prevent blood clots?  Yes, Aspirin Aspirin, ASA oral " tablets  What is this medicine?  ASPIRIN (AS pir in) is a pain reliever. It is used to treat mild pain and fever. This medicine is also used as directed by a doctor to prevent and to treat heart attacks, to prevent strokes, and to treat arthritis or inflammation.  This medicine may be used for other purposes; ask your health care provider or pharmacist if you have questions.  COMMON BRAND NAME(S): Aspir-Low, Aspir-Olga Lidia , Aspirtab , Felicitas Advanced Aspirin, Felicitas Aspirin Extra Strength, Felicitas Aspirin Plus, Felicitas Aspirin, Felicitas Genuine Aspirin, Felicitas Womens Aspirin , Bufferin Extra Strength, Bufferin Low Dose, Bufferin  What should I tell my health care provider before I take this medicine?  They need to know if you have any of these conditions:  -anemia  -asthma  -bleeding problems  -child with chickenpox, the flu, or other viral infection  -diabetes  -gout  -if you frequently drink alcohol containing drinks  -kidney disease  -liver disease  -low level of vitamin K  -lupus  -smoke tobacco  -stomach ulcers or other problems  -an unusual or allergic reaction to aspirin, tartrazine dye, other medicines, dyes, or preservatives  -pregnant or trying to get pregnant  -breast-feeding  How should I use this medicine?  Take this medicine by mouth with a glass of water. Follow the directions on the package or prescription label. You can take this medicine with or without food. If it upsets your stomach, take it with food. Do not take your medicine more often than directed.  Talk to your pediatrician regarding the use of this medicine in children. While this drug may be prescribed for children as young as 12 years of age for selected conditions, precautions do apply. Children and teenagers should not use this medicine to treat chicken pox or flu symptoms unless directed by a doctor.  Patients over 65 years old may have a stronger reaction and need a smaller dose.  Overdosage: If you think you have taken too much of this medicine  contact a poison control center or emergency room at once.  NOTE: This medicine is only for you. Do not share this medicine with others.  What if I miss a dose?  If you are taking this medicine on a regular schedule and miss a dose, take it as soon as you can. If it is almost time for your next dose, take only that dose. Do not take double or extra doses.  What may interact with this medicine?  Do not take this medicine with any of the following medications:  -cidofovir  -ketorolac  -probenecid  This medicine may also interact with the following medications:  -alcohol  -alendronate  -bismuth subsalicylate  -flavocoxid  -herbal supplements like feverfew, garlic, charley, ginkgo biloba, horse chestnut  -medicines for diabetes or glaucoma like acetazolamide, methazolamide  -medicines for gout  -medicines that treat or prevent blood clots like enoxaparin, heparin, ticlopidine, warfarin  -other aspirin and aspirin-like medicines  -NSAIDs, medicines for pain and inflammation, like ibuprofen or naproxen  -pemetrexed  -sulfinpyrazone  -varicella live vaccine  This list may not describe all possible interactions. Give your health care provider a list of all the medicines, herbs, non-prescription drugs, or dietary supplements you use. Also tell them if you smoke, drink alcohol, or use illegal drugs. Some items may interact with your medicine.  What should I watch for while using this medicine?  If you are treating yourself for pain, tell your doctor or health care professional if the pain lasts more than 10 days, if it gets worse, or if there is a new or different kind of pain. Tell your doctor if you see redness or swelling. Also, check with your doctor if you have a fever that lasts for more than 3 days. Only take this medicine to prevent heart attacks or blood clotting if prescribed by your doctor or health care professional.  Do not take aspirin or aspirin-like medicines with this medicine. Too much aspirin can be  dangerous. Always read the labels carefully.  This medicine can irritate your stomach or cause bleeding problems. Do not smoke cigarettes or drink alcohol while taking this medicine. Do not lie down for 30 minutes after taking this medicine to prevent irritation to your throat.  If you are scheduled for any medical or dental procedure, tell your healthcare provider that you are taking this medicine. You may need to stop taking this medicine before the procedure.  What side effects may I notice from receiving this medicine?  Side effects that you should report to your doctor or health care professional as soon as possible:  -allergic reactions like skin rash, itching or hives, swelling of the face, lips, or tongue  -black, tarry stools  -bloody, coffee ground-like vomit  -breathing problems  -changes in hearing, ringing in the ears  -confusion  -general ill feeling or flu-like symptoms  -pain on swallowing  -redness, blistering, peeling or loosening of the skin, including inside the mouth or nose  -trouble passing urine or change in the amount of urine  -unusual bleeding or bruising  -unusually weak or tired  -yellowing of the eyes or skin  Side effects that usually do not require medical attention (report to your doctor or health care professional if they continue or are bothersome):  -diarrhea or constipation  -nausea, vomiting  -stomach gas, heartburn  This list may not describe all possible side effects. Call your doctor for medical advice about side effects. You may report side effects to FDA at 0-213-FDA-5878.  Where should I keep my medicine?  Keep out of the reach of children.  Store at room temperature between 15 and 30 degrees C (59 and 86 degrees F). Protect from heat and moisture. Do not use this medicine if it has a strong vinegar smell. Throw away any unused medicine after the expiration date.  NOTE: This sheet is a summary. It may not cover all possible information. If you have questions about this  medicine, talk to your doctor, pharmacist, or health care provider.  © 2014, [employer]. (3/10/2009 10:44:17 AM)      · Is patient discharged on Warfarin / Coumadin?   No     · Is patient Post Blood Transfusion?  No    Fall Prevention in the Home   Falls can cause injuries and can affect people from all age groups. There are many simple things that you can do to make your home safe and to help prevent falls.  WHAT CAN I DO ON THE OUTSIDE OF MY HOME?  · Regularly repair the edges of walkways and driveways and fix any cracks.  · Remove high doorway thresholds.  · Trim any shrubbery on the main path into your home.  · Use bright outdoor lighting.  · Clear walkways of debris and clutter, including tools and rocks.  · Regularly check that handrails are securely fastened and in good repair. Both sides of any steps should have handrails.  · Install guardrails along the edges of any raised decks or porches.  · Have leaves, snow, and ice cleared regularly.  · Use sand or salt on walkways during winter months.  · In the garage, clean up any spills right away, including grease or oil spills.  WHAT CAN I DO IN THE BATHROOM?  · Use night lights.  · Install grab bars by the toilet and in the tub and shower. Do not use towel bars as grab bars.  · Use non-skid mats or decals on the floor of the tub or shower.  · If you need to sit down while you are in the shower, use a plastic, non-slip stool..  · Keep the floor dry. Immediately clean up any water that spills on the floor.  · Remove soap buildup in the tub or shower on a regular basis.  · Attach bath mats securely with double-sided non-slip rug tape.  · Remove throw rugs and other tripping hazards from the floor.  WHAT CAN I DO IN THE BEDROOM?  · Use night lights.  · Make sure that a bedside light is easy to reach.  · Do not use oversized bedding that drapes onto the floor.  · Have a firm chair that has side arms to use for getting dressed.  · Remove throw rugs and  other tripping hazards from the floor.  WHAT CAN I DO IN THE KITCHEN?   · Clean up any spills right away.  · Avoid walking on wet floors.  · Place frequently used items in easy-to-reach places.  · If you need to reach for something above you, use a sturdy step stool that has a grab bar.  · Keep electrical cables out of the way.  · Do not use floor polish or wax that makes floors slippery. If you have to use wax, make sure that it is non-skid floor wax.  · Remove throw rugs and other tripping hazards from the floor.  WHAT CAN I DO IN THE STAIRWAYS?  · Do not leave any items on the stairs.  · Make sure that there are handrails on both sides of the stairs. Fix handrails that are broken or loose. Make sure that handrails are as long as the stairways.  · Check any carpeting to make sure that it is firmly attached to the stairs. Fix any carpet that is loose or worn.  · Avoid having throw rugs at the top or bottom of stairways, or secure the rugs with carpet tape to prevent them from moving.  · Make sure that you have a light switch at the top of the stairs and the bottom of the stairs. If you do not have them, have them installed.  WHAT ARE SOME OTHER FALL PREVENTION TIPS?  · Wear closed-toe shoes that fit well and support your feet. Wear shoes that have rubber soles or low heels.  · When you use a stepladder, make sure that it is completely opened and that the sides are firmly locked. Have someone hold the ladder while you are using it. Do not climb a closed stepladder.  · Add color or contrast paint or tape to grab bars and handrails in your home. Place contrasting color strips on the first and last steps.  · Use mobility aids as needed, such as canes, walkers, scooters, and crutches.  · Turn on lights if it is dark. Replace any light bulbs that burn out.  · Set up furniture so that there are clear paths. Keep the furniture in the same spot.  · Fix any uneven floor surfaces.  · Choose a carpet design that does not hide  the edge of steps of a stairway.  · Be aware of any and all pets.  · Review your medicines with your healthcare provider. Some medicines can cause dizziness or changes in blood pressure, which increase your risk of falling.  Talk with your health care provider about other ways that you can decrease your risk of falls. This may include working with a physical therapist or  to improve your strength, balance, and endurance.     This information is not intended to replace advice given to you by your health care provider. Make sure you discuss any questions you have with your health care provider.     Document Released: 12/08/2003 Document Revised: 05/03/2016 Document Reviewed: 01/22/2016  [employer] Interactive Patient Education ©2016 [employer]        Atrial Fibrillation  Atrial fibrillation is a type of irregular heart rhythm (arrhythmia). During atrial fibrillation, the upper chambers of the heart (atria) quiver continuously in a chaotic pattern. This causes an irregular and often rapid heart rate.   Atrial fibrillation is the result of the heart becoming overloaded with disorganized signals that tell it to beat. These signals are normally released one at a time by a part of the right atrium called the sinoatrial node. They then travel from the atria to the lower chambers of the heart (ventricles), causing the atria and ventricles to contract and pump blood as they pass. In atrial fibrillation, parts of the atria outside of the sinoatrial node also release these signals. This results in two problems. First, the atria receive so many signals that they do not have time to fully contract. Second, the ventricles, which can only receive one signal at a time, beat irregularly and out of rhythm with the atria.   There are three types of atrial fibrillation:   · Paroxysmal. Paroxysmal atrial fibrillation starts suddenly and stops on its own within a week.  · Persistent. Persistent atrial fibrillation lasts for more  than a week. It may stop on its own or with treatment.  · Permanent. Permanent atrial fibrillation does not go away. Episodes of atrial fibrillation may lead to permanent atrial fibrillation.  Atrial fibrillation can prevent your heart from pumping blood normally. It increases your risk of stroke and can lead to heart failure.   CAUSES   · Heart conditions, including a heart attack, heart failure, coronary artery disease, and heart valve conditions.    · Inflammation of the sac that surrounds the heart (pericarditis).  · Blockage of an artery in the lungs (pulmonary embolism).  · Pneumonia or other infections.  · Chronic lung disease.  · Thyroid problems, especially if the thyroid is overactive (hyperthyroidism).  · Caffeine, excessive alcohol use, and use of some illegal drugs.    · Use of some medicines, including certain decongestants and diet pills.  · Heart surgery.    · Birth defects.    Sometimes, no cause can be found. When this happens, the atrial fibrillation is called lone atrial fibrillation. The risk of complications from atrial fibrillation increases if you have lone atrial fibrillation and you are age 60 years or older.  RISK FACTORS  · Heart failure.  · Coronary artery disease.  · Diabetes mellitus.    · High blood pressure (hypertension).    · Obesity.    · Other arrhythmias.    · Increased age.  SIGNS AND SYMPTOMS   · A feeling that your heart is beating rapidly or irregularly.    · A feeling of discomfort or pain in your chest.    · Shortness of breath.    · Sudden light-headedness or weakness.    · Getting tired easily when exercising.    · Urinating more often than normal (mainly when atrial fibrillation first begins).    In paroxysmal atrial fibrillation, symptoms may start and suddenly stop.  DIAGNOSIS   Your health care provider may be able to detect atrial fibrillation when taking your pulse. Your health care provider may have you take a test called an ambulatory electrocardiogram (ECG). An  ECG records your heartbeat patterns over a 24-hour period. You may also have other tests, such as:  · Transthoracic echocardiogram (TTE). During echocardiography, sound waves are used to evaluate how blood flows through your heart.  · Transesophageal echocardiogram (TASHA).  · Stress test. There is more than one type of stress test. If a stress test is needed, ask your health care provider about which type is best for you.  · Chest X-ray exam.  · Blood tests.  · Computed tomography (CT).  TREATMENT   Treatment may include:  · Treating any underlying conditions. For example, if you have an overactive thyroid, treating the condition may correct atrial fibrillation.  · Taking medicine. Medicines may be given to control a rapid heart rate or to prevent blood clots, heart failure, or a stroke.  · Having a procedure to correct the rhythm of the heart:  · Electrical cardioversion. During electrical cardioversion, a controlled, low-energy shock is delivered to the heart through your skin. If you have chest pain, very low blood pressure, or sudden heart failure, this procedure may need to be done as an emergency.  · Catheter ablation. During this procedure, heart tissues that send the signals that cause atrial fibrillation are destroyed.  · Surgical ablation. During this surgery, thin lines of heart tissue that carry the abnormal signals are destroyed. This procedure can either be an open-heart surgery or a minimally invasive surgery. With the minimally invasive surgery, small cuts are made to access the heart instead of a large opening.  · Pulmonary venous isolation. During this surgery, tissue around the veins that carry blood from the lungs (pulmonary veins) is destroyed. This tissue is thought to carry the abnormal signals.  HOME CARE INSTRUCTIONS   · Take medicines only as directed by your health care provider. Some medicines can make atrial fibrillation worse or recur.  · If blood thinners were prescribed by your health  care provider, take them exactly as directed. Too much blood-thinning medicine can cause bleeding. If you take too little, you will not have the needed protection against stroke and other problems.  · Perform blood tests at home if directed by your health care provider. Perform blood tests exactly as directed.  · Quit smoking if you smoke.  · Do not drink alcohol.  · Do not drink caffeinated beverages such as coffee, soda, and some teas. You may drink decaffeinated coffee, soda, or tea.    · Maintain a healthy weight. Do not use diet pills unless your health care provider approves. They may make heart problems worse.    · Follow diet instructions as directed by your health care provider.  · Exercise regularly as directed by your health care provider.  · Keep all follow-up visits as directed by your health care provider. This is important.  PREVENTION   The following substances can cause atrial fibrillation to recur:   · Caffeinated beverages.  · Alcohol.  · Certain medicines, especially those used for breathing problems.  · Certain herbs and herbal medicines, such as those containing ephedra or ginseng.  · Illegal drugs, such as cocaine and amphetamines.  Sometimes medicines are given to prevent atrial fibrillation from recurring. Proper treatment of any underlying condition is also important in helping prevent recurrence.   SEEK MEDICAL CARE IF:  · You notice a change in the rate, rhythm, or strength of your heartbeat.  · You suddenly begin urinating more frequently.  · You tire more easily when exerting yourself or exercising.  SEEK IMMEDIATE MEDICAL CARE IF:   · You have chest pain, abdominal pain, sweating, or weakness.  · You feel nauseous.  · You have shortness of breath.  · You suddenly have swollen feet and ankles.  · You feel dizzy.  · Your face or limbs feel numb or weak.  · You have a change in your vision or speech.  MAKE SURE YOU:   · Understand these instructions.  · Will watch your condition.  · Will  get help right away if you are not doing well or get worse.     This information is not intended to replace advice given to you by your health care provider. Make sure you discuss any questions you have with your health care provider.     Document Released: 12/18/2006 Document Revised: 01/08/2016 Document Reviewed: 04/13/2016  [employer] Interactive Patient Education ©2016 [employer]        Hypertension  Hypertension, commonly called high blood pressure, is when the force of blood pumping through your arteries is too strong. Your arteries are the blood vessels that carry blood from your heart throughout your body. A blood pressure reading consists of a higher number over a lower number, such as 110/72. The higher number (systolic) is the pressure inside your arteries when your heart pumps. The lower number (diastolic) is the pressure inside your arteries when your heart relaxes. Ideally you want your blood pressure below 120/80.  Hypertension forces your heart to work harder to pump blood. Your arteries may become narrow or stiff. Having untreated or uncontrolled hypertension can cause heart attack, stroke, kidney disease, and other problems.  RISK FACTORS  Some risk factors for high blood pressure are controllable. Others are not.   Risk factors you cannot control include:   · Race. You may be at higher risk if you are .  · Age. Risk increases with age.  · Gender. Men are at higher risk than women before age 45 years. After age 65, women are at higher risk than men.  Risk factors you can control include:  · Not getting enough exercise or physical activity.  · Being overweight.  · Getting too much fat, sugar, calories, or salt in your diet.  · Drinking too much alcohol.  SIGNS AND SYMPTOMS  Hypertension does not usually cause signs or symptoms. Extremely high blood pressure (hypertensive crisis) may cause headache, anxiety, shortness of breath, and nosebleed.  DIAGNOSIS  To check if you have  hypertension, your health care provider will measure your blood pressure while you are seated, with your arm held at the level of your heart. It should be measured at least twice using the same arm. Certain conditions can cause a difference in blood pressure between your right and left arms. A blood pressure reading that is higher than normal on one occasion does not mean that you need treatment. If it is not clear whether you have high blood pressure, you may be asked to return on a different day to have your blood pressure checked again. Or, you may be asked to monitor your blood pressure at home for 1 or more weeks.  TREATMENT  Treating high blood pressure includes making lifestyle changes and possibly taking medicine. Living a healthy lifestyle can help lower high blood pressure. You may need to change some of your habits.  Lifestyle changes may include:  · Following the DASH diet. This diet is high in fruits, vegetables, and whole grains. It is low in salt, red meat, and added sugars.  · Keep your sodium intake below 2,300 mg per day.  · Getting at least 30-45 minutes of aerobic exercise at least 4 times per week.  · Losing weight if necessary.  · Not smoking.  · Limiting alcoholic beverages.  · Learning ways to reduce stress.  Your health care provider may prescribe medicine if lifestyle changes are not enough to get your blood pressure under control, and if one of the following is true:  · You are 18-59 years of age and your systolic blood pressure is above 140.  · You are 60 years of age or older, and your systolic blood pressure is above 150.  · Your diastolic blood pressure is above 90.  · You have diabetes, and your systolic blood pressure is over 140 or your diastolic blood pressure is over 90.  · You have kidney disease and your blood pressure is above 140/90.  · You have heart disease and your blood pressure is above 140/90.  Your personal target blood pressure may vary depending on your medical  conditions, your age, and other factors.  HOME CARE INSTRUCTIONS  · Have your blood pressure rechecked as directed by your health care provider.    · Take medicines only as directed by your health care provider. Follow the directions carefully. Blood pressure medicines must be taken as prescribed. The medicine does not work as well when you skip doses. Skipping doses also puts you at risk for problems.  · Do not smoke.    · Monitor your blood pressure at home as directed by your health care provider.   SEEK MEDICAL CARE IF:   · You think you are having a reaction to medicines taken.  · You have recurrent headaches or feel dizzy.  · You have swelling in your ankles.  · You have trouble with your vision.  SEEK IMMEDIATE MEDICAL CARE IF:  · You develop a severe headache or confusion.  · You have unusual weakness, numbness, or feel faint.  · You have severe chest or abdominal pain.  · You vomit repeatedly.  · You have trouble breathing.  MAKE SURE YOU:   · Understand these instructions.  · Will watch your condition.  · Will get help right away if you are not doing well or get worse.     This information is not intended to replace advice given to you by your health care provider. Make sure you discuss any questions you have with your health care provider.     Document Released: 12/18/2006 Document Revised: 05/03/2016 Document Reviewed: 10/10/2014  [employer] Patient Education ©2016 [employer]            Depression / Suicide Risk    As you are discharged from this [facility] facility, it is important to learn how to keep safe from harming yourself.    Recognize the warning signs:  · Abrupt changes in personality, positive or negative- including increase in energy   · Giving away possessions  · Change in eating patterns- significant weight changes-  positive or negative  · Change in sleeping patterns- unable to sleep or sleeping all the time   · Unwillingness or inability to  communicate  · Depression  · Unusual sadness, discouragement and loneliness  · Talk of wanting to die  · Neglect of personal appearance   · Rebelliousness- reckless behavior  · Withdrawal from people/activities they love  · Confusion- inability to concentrate     If you or a loved one observes any of these behaviors or has concerns about self-harm, here's what you can do:  · Talk about it- your feelings and reasons for harming yourself  · Remove any means that you might use to hurt yourself (examples: pills, rope, extension cords, firearm)  · Get professional help from the community (Mental Health, Substance Abuse, psychological counseling)  · Do not be alone:Call your Safe Contact- someone whom you trust who will be there for you.  · Call your local CRISIS HOTLINE 518-5849 or 908-624-5888  · Call your local Children's Mobile Crisis Response Team [place] (399) 514-9435 or www.Jobulous  · Call the toll free National Suicide Prevention Hotlines   · National Suicide Prevention Lifeline 013-337-MGXE (5677)  · [address] [place] Hope Line Network 800-SUICIDE (534-3592)        Follow-up Information     1. Follow up with [facility] ([facility]).    Specialty:  Skilled Nursing Facility    Contact information    [address]  [facility] [address] [phone]        2. Follow up with Ashwin Maier M.D.. Call in 1 week.    Specialty:  Orthopaedics    Why:  Post-op follow up    Contact information    [address]  [address]  [place] [address] [phone]          3. Follow up with JONNY Spann. Call in 1 week.    Specialty:  Family Medicine    Why:  Follow up    Contact information    [address]  [place] [address] [phone]           Discharge Medication Instructions:    Below are the medications your physician expects you to take upon discharge:    Review all your home medications and newly ordered medications with your doctor and/or pharmacist. Follow medication  instructions as directed by your doctor and/or pharmacist.    Please keep your medication list with you and share with your physician.               Medication List      START taking these medications        Instructions    Morning Afternoon Evening Bedtime    bisacodyl 10 MG Supp   Commonly known as:  DULCOLAX   Next Dose Due:  As needed        Insert 1 Suppository in rectum 1 time daily as needed (if magnesium hydroxide ineffective after 24 hours).   Dose:  10 mg                        insulin lispro 100 UNIT/ML Soln   Last time this was given:  2 Units on 8/1/2017 11:50 AM   Commonly known as:  HUMALOG   Next Dose Due:  Today, 8/1 evening        Inject 2-9 Units as instructed 4 Times a Day,Before Meals and at Bedtime.   Dose:  2-9 Units                        magnesium hydroxide 400 MG/5ML Susp   Last time this was given:  30 mL on 7/31/2017 10:33 PM   Commonly known as:  MILK OF MAGNESIA   Next Dose Due:  As needed        Take 30 mL by mouth 1 time daily as needed (if polyethylene glycol ineffective after 24 hours).   Dose:  30 mL                        polyethylene glycol/lytes Pack   Last time this was given:  1 Packet on 8/1/2017  8:14 AM   Commonly known as:  MIRALAX   Next Dose Due:  Tomorrow morning 8/2        Take 1 Packet by mouth every day.   Dose:  17 g                        senna-docusate 8.6-50 MG Tabs   Last time this was given:  2 Tabs on 8/1/2017  8:14 AM   Commonly known as:  PERICOLACE or SENOKOT S   Next Dose Due:  Today 8/1 evening         Take 2 Tabs by mouth 2 Times a Day.   Dose:  2 Tab                        sodium chloride 1 GM Tabs   Last time this was given:  1 g on 8/1/2017 11:58 AM   Commonly known as:  SALT   Next Dose Due:  Today 8/1 evening         Take 1 Tab by mouth 3 times a day, with meals.   Dose:  1 g                          CONTINUE taking these medications        Instructions    Morning Afternoon Evening Bedtime    acetaminophen 500 MG Tabs   Commonly known as:  TYLENOL    Next Dose Due:  As needed        Take 500 mg by mouth every 6 hours as needed for Moderate Pain.   Dose:  500 mg                        alendronate 70 MG Tabs   Commonly known as:  FOSAMAX   Next Dose Due:  Monday 8/7        Take 70 mg by mouth every Monday.   Dose:  70 mg                        aspirin 81 MG Chew chewable tablet   Last time this was given:  81 mg on 8/1/2017  8:14 AM   Commonly known as:  ASA   Next Dose Due:  Tomorrow morning 8/2        Take 81 mg by mouth every morning.   Dose:  81 mg                        glipiZIDE 5 MG Tabs   Commonly known as:  GLUCOTROL   Next Dose Due:  Tomorrow morning 8/2        Take 5 mg by mouth every morning.   Dose:  5 mg                        HYDROmorphone 2 MG Tabs   Last time this was given:  2 mg on 7/29/2017  2:36 PM   Commonly known as:  DILAUDID   Next Dose Due:  As needed        Take 0.5-2 Tabs by mouth every 6 hours as needed for Severe Pain.   Dose:  1-4 mg                        metformin 1000 MG tablet   Commonly known as:  GLUCOPHAGE   Next Dose Due:  Tomorrow morning 8/2        Take 1,000 mg by mouth every morning.   Dose:  1000 mg                        metoprolol SR 50 MG Tb24   Last time this was given:  50 mg on 8/1/2017  8:14 AM   Commonly known as:  TOPROL XL   Next Dose Due:  Tomorrow morning 8/2        Take 25 mg by mouth every morning.   Dose:  25 mg                        oxybutynin 5 MG Tabs   Last time this was given:  2.5 mg on 8/1/2017  8:10 AM   Commonly known as:  DITROPAN   Next Dose Due:  Today 8/1 evening         Take 2.5 mg by mouth 2 Times a Day.   Dose:  2.5 mg                        pioglitazone 30 MG Tabs   Commonly known as:  ACTOS   Next Dose Due:  Tomorrow morning 8/2        Take 30 mg by mouth every morning.   Dose:  30 mg                        pravastatin 40 MG tablet   Last time this was given:  40 mg on 7/31/2017 10:33 PM   Commonly known as:  PRAVACHOL   Next Dose Due:  Tonight 8/1        Take 40 mg by mouth every  evening.   Dose:  40 mg                             Where to Get Your Medications      Information about where to get these medications is not yet available     ! Ask your nurse or doctor about these medications    - bisacodyl 10 MG Supp  - HYDROmorphone 2 MG Tabs  - insulin lispro 100 UNIT/ML Soln  - magnesium hydroxide 400 MG/5ML Susp  - polyethylene glycol/lytes Pack  - senna-docusate 8.6-50 MG Tabs  - sodium chloride 1 GM Tabs            Orders for after discharge     DME [facility]    Complete by:  As directed        REFERRAL TO SKILLED NURSING FACILITY    Complete by:  As directed              Instructions           Diet / Nutrition:    Follow any diet instructions given to you by your doctor or the dietician, including how much salt (sodium) you are allowed each day.    If you are overweight, talk to your doctor about a weight reduction plan.    Activity:    Remain physically active following your doctor's instructions about exercise and activity.    Rest often.     Any time you become even a little tired or short of breath, SIT DOWN and rest.    Worsening Symptoms:    Report any of the following signs and symptoms to the doctor's office immediately:    *Pain of jaw, arm, or neck  *Chest pain not relieved by medication                               *Dizziness or loss of consciousness  *Difficulty breathing even when at rest   *More tired than usual                                       *Bleeding drainage or swelling of surgical site  *Swelling of feet, ankles, legs or stomach                 *Fever (>100ºF)  *Pink or blood tinged sputum  *Weight gain (3lbs/day or 5lbs /week)           *Shock from internal defibrillator (if applicable)  *Palpitations or irregular heartbeats                *Cool and/or numb extremities    Stroke Awareness    Common Risk Factors for Stroke include:    Age  Atrial Fibrillation  Carotid Artery Stenosis  Diabetes Mellitus  Excessive alcohol consumption  High blood pressure  Overweight    Physical inactivity  Smoking    Warning signs and symptoms of a stroke include:    *Sudden numbness or weakness of the face, arm or leg (especially on one side of the body).  *Sudden confusion, trouble speaking or understanding.  *Sudden trouble seeing in one or both eyes.  *Sudden trouble walking, dizziness, loss of balance or coordination.Sudden severe headache with no known cause.    It is very important to get treatment quickly when a stroke occurs. If you experience any of the above warning signs, call 911 immediately.                   Disclaimer         Quit Smoking / Tobacco Use:    I understand the use of any tobacco products increases my chance of suffering from future heart disease or stroke and could cause other illnesses which may shorten my life. Quitting the use of tobacco products is the single most important thing I can do to improve my health. For further information on smoking / tobacco cessation call a Toll Free Quit Line at 1-615.714.7221 (*National Cancer [place]) or 1-387.714.4124 (American Lung Association) or you can access the web based program at www.lungKidbox.org.    [place] Tobacco Users Help Line:  (167) 999-7659       Toll Free: 1-923.878.1980  Quit Tobacco Program [facility] [phone]    Crisis Hotline:    [place] Crisis Hotline:  0-669-BFBIZLY or 1-560.888.3111    [place] Crisis Hotline:    1-783.483.8842 or 919-630-8997    Discharge Survey:   Thank you for choosing [facility]. We hope we did everything we could to make your hospital stay a pleasant one. You may be receiving a phone survey and we would appreciate your time and participation in answering the questions. Your input is very valuable to us in our efforts to improve our service to our patients and their families.        My signature on this form indicates that:    1. I have reviewed and understand the above information.  2. My questions regarding this information have been answered to my  satisfaction.  3. I have formulated a plan with my discharge nurse to obtain my prescribed medications for home.                  Disclaimer         __________________________________                     __________       ________                       Patient Signature                                                 Date                    Time

## 2017-07-28 NOTE — IP AVS SNAPSHOT
8/1/2017    Deanna Ram  [address] [facility] [address]    Dear Deanna:    [facility] wants to ensure your discharge home is safe and you or your loved ones have had all of your questions answered regarding your care after you leave the hospital.    Below is a list of resources and contact information should you have any questions regarding your hospital stay, follow-up instructions, or active medical symptoms.    Questions or Concerns Regarding… Contact   Medical Questions Related to Your Discharge  (7 days a week, 8am-5pm) Contact a Nurse Care Coordinator   [phone]   Medical Questions Not Related to Your Discharge  (24 hours a day / 7 days a week)  Contact the Nurse Health Line   [phone]    Medications or Discharge Instructions Refer to your discharge packet   or contact your [facility] Primary Care Provider   [phone]   Follow-up Appointment(s) Schedule your appointment via [employer]   or contact Scheduling [phone]   Billing Review your statement via [employer]  or contact Billing [phone]   Medical Records Review your records via [employer]   or contact Medical Records [phone]     You may receive a telephone call within two days of discharge. This call is to make certain you understand your discharge instructions and have the opportunity to have any questions answered. You can also easily access your medical information, test results and upcoming appointments via the [employer] free online health management tool. You can learn more and sign up at [employer]. For assistance setting up your [employer] account, please call [phone].    Once again, we want to ensure your discharge home is safe and that you have a clear understanding of any next steps in your care. If you have any questions or concerns, please do not hesitate to contact us, we are here for you. Thank you for choosing [facility] for your healthcare needs.    Sincerely,    Your [facility] Healthcare Team

## 2017-07-28 NOTE — IP AVS SNAPSHOT
" <p align=\"LEFT\"><IMG SRC=\"[email or web address]\" alt=\"Image\" WIDTH=\"50%\" HEIGHT=\"200\" BORDER=\"\"></p>                   Name:Deanna Ram  Medical Record Number:[id number]  CSN: [id number]    YOB: 1931   Age: 86 y.o.  Sex: female  HT:1.549 m (5' 1\") WT: 59.6 kg (131 lb 6.3 oz)          Admit Date: 7/28/2017     Discharge Date:   Today's Date: 8/1/2017  Attending Doctor:  Papa Mcdermott M.D.                  Allergies:  Morphine and related and Levaquin          Follow-up Information     1. Follow up with [facility] ([facility]).    Specialty:  Skilled Nursing Facility    Contact information    [address] [address]  [phone]        2. Follow up with Ashwin Maier M.D.. Call in 1 week.    Specialty:  Orthopaedics    Why:  Post-op follow up    Contact information    [address]  [address]  [place] [address] [phone]          3. Follow up with JONNY Spann. Call in 1 week.    Specialty:  Family Medicine    Why:  Follow up    Contact information    [address]  [place] [address] [phone]           Medication List      Take these Medications        Instructions    acetaminophen 500 MG Tabs   Commonly known as:  TYLENOL    Take 500 mg by mouth every 6 hours as needed for Moderate Pain.   Dose:  500 mg       alendronate 70 MG Tabs   Commonly known as:  FOSAMAX    Take 70 mg by mouth every Monday.   Dose:  70 mg       aspirin 81 MG Chew chewable tablet   Commonly known as:  ASA    Take 81 mg by mouth every morning.   Dose:  81 mg       bisacodyl 10 MG Supp   Commonly known as:  DULCOLAX    Insert 1 Suppository in rectum 1 time daily as needed (if magnesium hydroxide ineffective after 24 hours).   Dose:  10 mg       glipiZIDE 5 MG Tabs   Commonly known as:  GLUCOTROL    Take 5 mg by mouth every morning.   Dose:  5 mg       HYDROmorphone 2 MG Tabs   Commonly known as:  DILAUDID    Take 0.5-2 Tabs by mouth every 6 hours as " needed for Severe Pain.   Dose:  1-4 mg       insulin lispro 100 UNIT/ML Soln   Commonly known as:  HUMALOG    Inject 2-9 Units as instructed 4 Times a Day,Before Meals and at Bedtime.   Dose:  2-9 Units       magnesium hydroxide 400 MG/5ML Susp   Commonly known as:  MILK OF MAGNESIA    Take 30 mL by mouth 1 time daily as needed (if polyethylene glycol ineffective after 24 hours).   Dose:  30 mL       metformin 1000 MG tablet   Commonly known as:  GLUCOPHAGE    Take 1,000 mg by mouth every morning.   Dose:  1000 mg       metoprolol SR 50 MG Tb24   Commonly known as:  TOPROL XL    Take 25 mg by mouth every morning.   Dose:  25 mg       oxybutynin 5 MG Tabs   Commonly known as:  DITROPAN    Take 2.5 mg by mouth 2 Times a Day.   Dose:  2.5 mg       pioglitazone 30 MG Tabs   Commonly known as:  ACTOS    Take 30 mg by mouth every morning.   Dose:  30 mg       polyethylene glycol/lytes Pack   Commonly known as:  MIRALAX    Take 1 Packet by mouth every day.   Dose:  17 g       pravastatin 40 MG tablet   Commonly known as:  PRAVACHOL    Take 40 mg by mouth every evening.   Dose:  40 mg       senna-docusate 8.6-50 MG Tabs   Commonly known as:  PERICOLACE or SENOKOT S    Take 2 Tabs by mouth 2 Times a Day.   Dose:  2 Tab       sodium chloride 1 GM Tabs   Commonly known as:  SALT    Take 1 Tab by mouth 3 times a day, with meals.   Dose:  1 g

## 2017-07-29 ENCOUNTER — APPOINTMENT (OUTPATIENT)
Dept: RADIOLOGY | Facility: MEDICAL CENTER | Age: 82
DRG: 492 | End: 2017-07-29
Attending: ORTHOPAEDIC SURGERY
Payer: COMMERCIAL

## 2017-07-29 LAB
ANION GAP SERPL CALC-SCNC: 6 MMOL/L (ref 0–11.9)
BASOPHILS # BLD AUTO: 0.5 % (ref 0–1.8)
BASOPHILS # BLD: 0.05 K/UL (ref 0–0.12)
BUN SERPL-MCNC: 13 MG/DL (ref 8–22)
CALCIUM SERPL-MCNC: 8.4 MG/DL (ref 8.5–10.5)
CHLORIDE SERPL-SCNC: 89 MMOL/L (ref 96–112)
CO2 SERPL-SCNC: 26 MMOL/L (ref 20–33)
CREAT SERPL-MCNC: 0.86 MG/DL (ref 0.5–1.4)
EOSINOPHIL # BLD AUTO: 0.14 K/UL (ref 0–0.51)
EOSINOPHIL NFR BLD: 1.5 % (ref 0–6.9)
ERYTHROCYTE [DISTWIDTH] IN BLOOD BY AUTOMATED COUNT: 49.1 FL (ref 35.9–50)
GFR SERPL CREATININE-BSD FRML MDRD: >60 ML/MIN/1.73 M 2
GLUCOSE BLD-MCNC: 169 MG/DL (ref 65–99)
GLUCOSE BLD-MCNC: 220 MG/DL (ref 65–99)
GLUCOSE BLD-MCNC: 233 MG/DL (ref 65–99)
GLUCOSE SERPL-MCNC: 147 MG/DL (ref 65–99)
HCT VFR BLD AUTO: 33.7 % (ref 37–47)
HGB BLD-MCNC: 10.7 G/DL (ref 12–16)
IMM GRANULOCYTES # BLD AUTO: 0.05 K/UL (ref 0–0.11)
IMM GRANULOCYTES NFR BLD AUTO: 0.5 % (ref 0–0.9)
LYMPHOCYTES # BLD AUTO: 1.23 K/UL (ref 1–4.8)
LYMPHOCYTES NFR BLD: 13.4 % (ref 22–41)
MCH RBC QN AUTO: 27.7 PG (ref 27–33)
MCHC RBC AUTO-ENTMCNC: 31.8 G/DL (ref 33.6–35)
MCV RBC AUTO: 87.3 FL (ref 81.4–97.8)
MONOCYTES # BLD AUTO: 1.04 K/UL (ref 0–0.85)
MONOCYTES NFR BLD AUTO: 11.3 % (ref 0–13.4)
NEUTROPHILS # BLD AUTO: 6.69 K/UL (ref 2–7.15)
NEUTROPHILS NFR BLD: 72.8 % (ref 44–72)
NRBC # BLD AUTO: 0 K/UL
NRBC BLD AUTO-RTO: 0 /100 WBC
PLATELET # BLD AUTO: 353 K/UL (ref 164–446)
PMV BLD AUTO: 9.1 FL (ref 9–12.9)
POTASSIUM SERPL-SCNC: 3.9 MMOL/L (ref 3.6–5.5)
RBC # BLD AUTO: 3.86 M/UL (ref 4.2–5.4)
SODIUM SERPL-SCNC: 121 MMOL/L (ref 135–145)
WBC # BLD AUTO: 9.2 K/UL (ref 4.8–10.8)

## 2017-07-29 PROCEDURE — C1713 ANCHOR/SCREW BN/BN,TIS/BN: HCPCS | Performed by: ORTHOPAEDIC SURGERY

## 2017-07-29 PROCEDURE — 500872 HCHG NUT, ASIF FOR 4.5 CORT SCREW: Performed by: ORTHOPAEDIC SURGERY

## 2017-07-29 PROCEDURE — 160036 HCHG PACU - EA ADDL 30 MINS PHASE I: Performed by: ORTHOPAEDIC SURGERY

## 2017-07-29 PROCEDURE — 502240 HCHG MISC OR SUPPLY RC 0272: Performed by: ORTHOPAEDIC SURGERY

## 2017-07-29 PROCEDURE — 501445 HCHG STAPLER, SKIN DISP: Performed by: ORTHOPAEDIC SURGERY

## 2017-07-29 PROCEDURE — 501244 HCHG SCREW, ASIF FINE THRD: Performed by: ORTHOPAEDIC SURGERY

## 2017-07-29 PROCEDURE — 36415 COLL VENOUS BLD VENIPUNCTURE: CPT

## 2017-07-29 PROCEDURE — 500054 HCHG BANDAGE, ELASTIC 6: Performed by: ORTHOPAEDIC SURGERY

## 2017-07-29 PROCEDURE — C1769 GUIDE WIRE: HCPCS | Performed by: ORTHOPAEDIC SURGERY

## 2017-07-29 PROCEDURE — A6454 SELF-ADHER BAND W>=3" <5"/YD: HCPCS | Performed by: ORTHOPAEDIC SURGERY

## 2017-07-29 PROCEDURE — 770006 HCHG ROOM/CARE - MED/SURG/GYN SEMI*

## 2017-07-29 PROCEDURE — 160029 HCHG SURGERY MINUTES - 1ST 30 MINS LEVEL 4: Performed by: ORTHOPAEDIC SURGERY

## 2017-07-29 PROCEDURE — 160048 HCHG OR STATISTICAL LEVEL 1-5: Performed by: ORTHOPAEDIC SURGERY

## 2017-07-29 PROCEDURE — 360976 HOYER LARGE SLING UP TO 300LBS: Performed by: INTERNAL MEDICINE

## 2017-07-29 PROCEDURE — 99223 1ST HOSP IP/OBS HIGH 75: CPT | Performed by: HOSPITALIST

## 2017-07-29 PROCEDURE — 700102 HCHG RX REV CODE 250 W/ 637 OVERRIDE(OP): Performed by: HOSPITALIST

## 2017-07-29 PROCEDURE — 160002 HCHG RECOVERY MINUTES (STAT): Performed by: ORTHOPAEDIC SURGERY

## 2017-07-29 PROCEDURE — 501236: Performed by: ORTHOPAEDIC SURGERY

## 2017-07-29 PROCEDURE — 700111 HCHG RX REV CODE 636 W/ 250 OVERRIDE (IP)

## 2017-07-29 PROCEDURE — A6222 GAUZE <=16 IN NO W/SAL W/O B: HCPCS | Performed by: ORTHOPAEDIC SURGERY

## 2017-07-29 PROCEDURE — 700111 HCHG RX REV CODE 636 W/ 250 OVERRIDE (IP): Performed by: HOSPITALIST

## 2017-07-29 PROCEDURE — 500122 HCHG BOVIE, BLADE: Performed by: ORTHOPAEDIC SURGERY

## 2017-07-29 PROCEDURE — 501838 HCHG SUTURE GENERAL: Performed by: ORTHOPAEDIC SURGERY

## 2017-07-29 PROCEDURE — A9270 NON-COVERED ITEM OR SERVICE: HCPCS | Performed by: HOSPITALIST

## 2017-07-29 PROCEDURE — 700105 HCHG RX REV CODE 258: Performed by: HOSPITALIST

## 2017-07-29 PROCEDURE — 85025 COMPLETE CBC W/AUTO DIFF WBC: CPT

## 2017-07-29 PROCEDURE — 160035 HCHG PACU - 1ST 60 MINS PHASE I: Performed by: ORTHOPAEDIC SURGERY

## 2017-07-29 PROCEDURE — 700101 HCHG RX REV CODE 250

## 2017-07-29 PROCEDURE — 160041 HCHG SURGERY MINUTES - EA ADDL 1 MIN LEVEL 4: Performed by: ORTHOPAEDIC SURGERY

## 2017-07-29 PROCEDURE — 80048 BASIC METABOLIC PNL TOTAL CA: CPT

## 2017-07-29 PROCEDURE — 160009 HCHG ANES TIME/MIN: Performed by: ORTHOPAEDIC SURGERY

## 2017-07-29 PROCEDURE — 501273 HCHG SCREW, LOCK 3.5 ST: Performed by: ORTHOPAEDIC SURGERY

## 2017-07-29 PROCEDURE — 73600 X-RAY EXAM OF ANKLE: CPT | Mod: LT

## 2017-07-29 PROCEDURE — 0QSH04Z REPOSITION LEFT TIBIA WITH INTERNAL FIXATION DEVICE, OPEN APPROACH: ICD-10-PCS | Performed by: ORTHOPAEDIC SURGERY

## 2017-07-29 PROCEDURE — 500461 HCHG DRILL BIT, CANNULATED: Performed by: ORTHOPAEDIC SURGERY

## 2017-07-29 PROCEDURE — 82962 GLUCOSE BLOOD TEST: CPT | Mod: 91

## 2017-07-29 PROCEDURE — 500881 HCHG PACK, EXTREMITY: Performed by: ORTHOPAEDIC SURGERY

## 2017-07-29 DEVICE — IMPLANTABLE DEVICE: Type: IMPLANTABLE DEVICE | Site: ANKLE | Status: FUNCTIONAL

## 2017-07-29 DEVICE — SCREW CORT 3.5X14MM ST HEX (4TX6+1TX4+1TX3=31)(SDS=22): Type: IMPLANTABLE DEVICE | Site: ANKLE | Status: FUNCTIONAL

## 2017-07-29 DEVICE — PLATE LCP 1/3 TUBULAR 8-H - (4SFLX1=4): Type: IMPLANTABLE DEVICE | Site: ANKLE | Status: FUNCTIONAL

## 2017-07-29 DEVICE — WASHER ASIF 7.0 SM 219.98 (7TX6+4TX4=58): Type: IMPLANTABLE DEVICE | Site: ANKLE | Status: FUNCTIONAL

## 2017-07-29 DEVICE — SCREW LOCK 3.5X14MM ST T15 - (4SFLX6+LPPELVX4=28): Type: IMPLANTABLE DEVICE | Site: ANKLE | Status: FUNCTIONAL

## 2017-07-29 DEVICE — SCREW LOCK 3.5X12MM ST T15 - (4SFLX6+LPPELVX4=28): Type: IMPLANTABLE DEVICE | Site: ANKLE | Status: FUNCTIONAL

## 2017-07-29 RX ORDER — LABETALOL HYDROCHLORIDE 5 MG/ML
INJECTION, SOLUTION INTRAVENOUS
Status: COMPLETED
Start: 2017-07-29 | End: 2017-07-29

## 2017-07-29 RX ADMIN — OXYBUTYNIN CHLORIDE 2.5 MG: 5 TABLET ORAL at 20:38

## 2017-07-29 RX ADMIN — OXYCODONE HYDROCHLORIDE 5 MG: 5 TABLET ORAL at 12:16

## 2017-07-29 RX ADMIN — STANDARDIZED SENNA CONCENTRATE AND DOCUSATE SODIUM 2 TABLET: 8.6; 5 TABLET, FILM COATED ORAL at 20:38

## 2017-07-29 RX ADMIN — PRAVASTATIN SODIUM 40 MG: 10 TABLET ORAL at 20:38

## 2017-07-29 RX ADMIN — LABETALOL HYDROCHLORIDE 10 MG: 5 INJECTION, SOLUTION INTRAVENOUS at 10:38

## 2017-07-29 RX ADMIN — METOPROLOL SUCCINATE 25 MG: 25 TABLET, EXTENDED RELEASE ORAL at 11:32

## 2017-07-29 RX ADMIN — FENTANYL CITRATE 50 MCG: 50 INJECTION, SOLUTION INTRAMUSCULAR; INTRAVENOUS at 10:02

## 2017-07-29 RX ADMIN — OXYCODONE HYDROCHLORIDE 10 MG: 10 TABLET ORAL at 20:43

## 2017-07-29 RX ADMIN — FENTANYL CITRATE 50 MCG: 50 INJECTION, SOLUTION INTRAMUSCULAR; INTRAVENOUS at 10:50

## 2017-07-29 RX ADMIN — STANDARDIZED SENNA CONCENTRATE AND DOCUSATE SODIUM 2 TABLET: 8.6; 5 TABLET, FILM COATED ORAL at 11:31

## 2017-07-29 RX ADMIN — FENTANYL CITRATE 50 MCG: 50 INJECTION, SOLUTION INTRAMUSCULAR; INTRAVENOUS at 10:17

## 2017-07-29 RX ADMIN — HYDROMORPHONE HYDROCHLORIDE 0.5 MG: 1 INJECTION, SOLUTION INTRAMUSCULAR; INTRAVENOUS; SUBCUTANEOUS at 13:28

## 2017-07-29 RX ADMIN — INSULIN LISPRO 2 UNITS: 100 INJECTION, SOLUTION INTRAVENOUS; SUBCUTANEOUS at 17:30

## 2017-07-29 RX ADMIN — HYDROMORPHONE HYDROCHLORIDE 2 MG: 2 TABLET ORAL at 14:36

## 2017-07-29 RX ADMIN — INSULIN LISPRO 3 UNITS: 100 INJECTION, SOLUTION INTRAVENOUS; SUBCUTANEOUS at 11:48

## 2017-07-29 RX ADMIN — FENTANYL CITRATE 50 MCG: 50 INJECTION, SOLUTION INTRAMUSCULAR; INTRAVENOUS at 10:40

## 2017-07-29 RX ADMIN — INSULIN LISPRO 3 UNITS: 100 INJECTION, SOLUTION INTRAVENOUS; SUBCUTANEOUS at 20:48

## 2017-07-29 RX ADMIN — CEFTRIAXONE 2 G: 2 INJECTION, POWDER, FOR SOLUTION INTRAMUSCULAR; INTRAVENOUS at 11:31

## 2017-07-29 RX ADMIN — OXYBUTYNIN CHLORIDE 2.5 MG: 5 TABLET ORAL at 11:55

## 2017-07-29 RX ADMIN — ASPIRIN 81 MG: 81 TABLET, CHEWABLE ORAL at 11:32

## 2017-07-29 ASSESSMENT — PAIN SCALES - GENERAL
PAINLEVEL_OUTOF10: 5
PAINLEVEL_OUTOF10: 8
PAINLEVEL_OUTOF10: 4
PAINLEVEL_OUTOF10: 5
PAINLEVEL_OUTOF10: 8
PAINLEVEL_OUTOF10: 3
PAINLEVEL_OUTOF10: 4
PAINLEVEL_OUTOF10: 5
PAINLEVEL_OUTOF10: 9
PAINLEVEL_OUTOF10: 3
PAINLEVEL_OUTOF10: 8

## 2017-07-29 ASSESSMENT — ENCOUNTER SYMPTOMS
MYALGIAS: 0
RESPIRATORY NEGATIVE: 1
DIARRHEA: 0
FLANK PAIN: 0
CONSTIPATION: 1
WEIGHT LOSS: 0
CHILLS: 0
ABDOMINAL PAIN: 0
BRUISES/BLEEDS EASILY: 0
NAUSEA: 1
BACK PAIN: 0
WHEEZING: 0
VOMITING: 1
HEADACHES: 0
NEUROLOGICAL NEGATIVE: 1
SHORTNESS OF BREATH: 0
LOSS OF CONSCIOUSNESS: 1
LOSS OF CONSCIOUSNESS: 0
FEVER: 0
DEPRESSION: 0
VOMITING: 0
CARDIOVASCULAR NEGATIVE: 1
CONSTITUTIONAL NEGATIVE: 1
WEAKNESS: 0
DIZZINESS: 0
NERVOUS/ANXIOUS: 0
COUGH: 0
PSYCHIATRIC NEGATIVE: 1

## 2017-07-29 NOTE — H&P
" Hospital Medicine History and Physical    Date of Service  7/29/2017    Chief Complaint  Chief Complaint   Patient presents with   • T-5000 Ankle Injury     Left    • Other     [facility] \"[facility] left ankle, metatarsal fracture left, UTI, asymptomatic hyponatremia\".         History of Presenting Illness  86 y.o. female who presented 7/28/2017 with ground-level fall at home resulting in a trimalleolar fracture of the left ankle. The patient was diagnosed with a urinary tract infection 2 days ago and was started on Septra. She started out at [facility] and they sent her home thinking that she could follow-up with orthopedics outpatient but after further review decided that the case was more complex and she did need orthopedic follow-up ASAP. [facility] informed her they couldn't handle her case as they didn't have an orthopedic surgeon on staff. Therefore she came here for further evaluation and orthopedics consultation. She was evaluated for hypertension and TIA symptoms about a week ago as well with negative workup.    Primary Care Physician  Mark Sommers M.D.    Consultants  Orthopedic surgery Dr. Cota    Code Status  Full code    Review of Systems  Review of Systems   Constitutional: Negative.  Negative for fever, chills, weight loss and malaise/fatigue.   HENT: Negative.    Respiratory: Negative.  Negative for cough and shortness of breath.    Cardiovascular: Negative.  Negative for chest pain and leg swelling.   Gastrointestinal: Positive for nausea and vomiting. Negative for abdominal pain.   Genitourinary: Negative.  Negative for dysuria and flank pain.   Musculoskeletal: Positive for joint pain. Negative for myalgias and back pain.   Neurological: Negative.  Negative for dizziness, loss of consciousness and weakness.   Endo/Heme/Allergies: Negative.  Does not bruise/bleed easily.   Psychiatric/Behavioral: Negative.  Negative for depression. " The patient is not nervous/anxious.    All other systems reviewed and are negative.         Past Medical History  Past Medical History   Diagnosis Date   • Hypertension    • Type II or unspecified type diabetes mellitus without mention of complication, not stated as uncontrolled    • Hypercholesteremia    • Poor historian    • Chronic atrial fibrillation (CMS-HCC)    • Osteoporosis        Surgical History  Past Surgical History   Procedure Laterality Date   • Mastectomy  left   • Lobectomy       right lung -age 25   • Tonsillectomy and adenoidectomy     • Mastoidectomy     • Appendectomy     • Cholecystectomy         Medications  No current facility-administered medications on file prior to encounter.     Current Outpatient Prescriptions on File Prior to Encounter   Medication Sig Dispense Refill   • HYDROmorphone (DILAUDID) 2 MG Tab Take 0.5-2 Tabs by mouth every 6 hours as needed for Severe Pain. 7 Tab 0   • sulfamethoxazole-trimethoprim (BACTRIM DS) 800-160 MG tablet Take 1 Tab by mouth 2 times a day for 5 days. 10 Tab 0   • alendronate (FOSAMAX) 70 MG Tab Take 70 mg by mouth every Monday.     • metformin (GLUCOPHAGE) 1000 MG tablet Take 1,000 mg by mouth every morning.     • glipiZIDE (GLUCOTROL) 5 MG Tab Take 5 mg by mouth every morning.     • pravastatin (PRAVACHOL) 40 MG tablet Take 40 mg by mouth every evening.         Family History  No family history on file.    Social History  Social History   Substance Use Topics   • Smoking status: Former Smoker   • Smokeless tobacco: Never Used   • Alcohol Use: Yes      Comment: rare       Allergies  Allergies   Allergen Reactions   • Morphine And Related [Codeine] Nausea   • Levaquin Rash     Patient can't remember what happens when she takes it.          Physical Exam  Laboratory   Hemodynamics  Temp (24hrs), Av.2 °C (97.2 °F), Min:36.1 °C (96.9 °F), Max:36.5 °C (97.7 °F)   Temperature: 36.1 °C (96.9 °F)  Pulse  Av.8  Min: 64  Max: 88    Blood Pressure :  143/52 mmHg      Respiratory      Respiration: 16, Pulse Oximetry: 100 %             Physical Exam   Constitutional: She appears well-developed and well-nourished. No distress.   HENT:   Nose: Nose normal.   Mouth/Throat: Oropharynx is clear and moist. No oropharyngeal exudate.   Eyes: Conjunctivae are normal. Right eye exhibits no discharge. Left eye exhibits no discharge. No scleral icterus.   Neck: No JVD present. No tracheal deviation present.   Cardiovascular: Normal heart sounds.  An irregular rhythm present. Tachycardia present.    Slightly tachycardic   Pulmonary/Chest: Effort normal and breath sounds normal. No stridor. No respiratory distress. She has no wheezes. She has no rales. She exhibits no tenderness.   Abdominal: Soft. Bowel sounds are normal. She exhibits no distension. There is no tenderness.   Musculoskeletal: She exhibits no edema or tenderness.   Left ankle bandaged with ice pack   Neurological: She is alert. No cranial nerve deficit. She exhibits normal muscle tone.   Skin: Skin is warm and dry. She is not diaphoretic. No pallor.   Psychiatric: She has a normal mood and affect. Her behavior is normal.   Nursing note and vitals reviewed.      Recent Labs      07/28/17 1900   WBC  10.3   RBC  4.17*   HEMOGLOBIN  12.1   HEMATOCRIT  35.0*   MCV  83.9   MCH  29.0   MCHC  34.6   RDW  45.7   PLATELETCT  371   MPV  8.8*     Recent Labs      07/28/17 1948   SODIUM  121*   POTASSIUM  3.8   CHLORIDE  89*   CO2  22   GLUCOSE  151*   BUN  14   CREATININE  0.97   CALCIUM  8.6     Recent Labs      07/28/17 1948   ALTSGPT  15   ASTSGOT  16   ALKPHOSPHAT  51   TBILIRUBIN  0.3   LIPASE  24   GLUCOSE  151*     Recent Labs      07/28/17 1900   APTT  34.9   INR  1.00     Recent Labs      07/28/17 1900   BNPBTYPENAT  195*         Lab Results   Component Value Date    TROPONINI <0.01 07/28/2017       Imaging  Trimalleolar fracture of left ankle and metatarsal fracture.    Chest xray no acute findings    Assessment/Plan     I anticipate this patient will require at least two midnights for appropriate medical management, necessitating inpatient admission.    Trimalleolar fracture (present on admission)  Assessment & Plan  Dr. Cota evaluating and planning to take to the OR tomorrow for operative repair. In the meanwhile I have ordered pain medication for her. She was given some morphine in the emergency department which is making her vomit. She tolerates Dilaudid at home and I have ordered this in both oral and IV form for her as well as Zofran for the nausea and vomiting.    Chronic atrial fibrillation (CMS-HCC) (present on admission)  Assessment & Plan  Stable, with slight tachycardia at this time probably due to pain and vomiting. Heart rate comes down when patient is more comfortable. She is not on anticoagulation thus we do not need to reverse anything at this time. Continue metoprolol.    UTI (urinary tract infection) (present on admission)  Assessment & Plan  Patient given Rocephin in the emergency department, I have continued this for a total of 3 days. Given that in this regional area urinary tract infections have about a 20% resistance rate to Septra we will elect to treat with short courses Rocephin instead.    Type II diabetes mellitus (CMS-HCC) (present on admission)  Assessment & Plan  Hold glipizide and metformin as well as pioglitazone well nothing by mouth for surgery. Accu-Cheks every 6 hours and sliding scale insulin ordered. May consider resuming oral hypoglycemics antidiabetic agents when patient is out of surgery and eating again.    Hypertension (present on admission)  Assessment & Plan  Continue metoprolol    Hyperlipidemia (present on admission)  Assessment & Plan  Continue pravastatin.    Osteoporosis (present on admission)  Assessment & Plan  On Fosamax as an outpatient, hold this while healing from acute fractures.        VTE prophylaxis: SCDs until cleared post op for  pharmacological prophylaxis..

## 2017-07-29 NOTE — PROGRESS NOTES
[facility] Hospitalist Progress Note    Date of Service: 2017    Chief Complaint  86 y.o. Female with past medical history type II diabetes (on Glucotrol, Actos and metformin), dyslipidemia (on Pravachol), spastic bladder(on Ditropan), osteoporosis(on Fosamax), hypertension (on metoprolol), recent UTI (being treated with Bactrim )- recently also had TIA workup about a week ago which was negative. Admitted 2017 with trimalleolar fracture, taken to the OR by Dr. Ashwin Maier 2017.    Interval Problem Update  She is anxious about drifting off to sleep intermittently- reassured her this is normal postoperative and with pain medications.She is nauseated but has not vomited.  She lives with her spouse, in a single-story home, she has a walk-in shower but tends not to use it, because it does not have any bars to hold onto.    Consultants/Specialty  Orthopedic- [place]    Disposition  TBD, likely skilled        Review of Systems   Respiratory: Negative for cough, shortness of breath and wheezing.    Cardiovascular: Negative for chest pain.   Gastrointestinal: Positive for nausea and constipation (last BM Thursday). Negative for vomiting, abdominal pain and diarrhea.   Genitourinary: Positive for dysuria.   Neurological: Positive for loss of consciousness. Negative for dizziness and headaches.      Physical Exam  Laboratory/Imaging   Hemodynamics  Temp (24hrs), Av.2 °C (97.1 °F), Min:36.1 °C (96.9 °F), Max:36.5 °C (97.7 °F)   Temperature: 36.1 °C (97 °F)  Pulse  Av.8  Min: 64  Max: 89 Heart Rate (Monitored): 88  Blood Pressure : (!) 175/61 mmHg      Respiratory      Respiration: 18, Pulse Oximetry: 99 %             Fluids  No intake or output data in the 24 hours ending 17 0801    Nutrition  Orders Placed This Encounter   Procedures   • Diet Order     Standing Status: Standing      Number of Occurrences: 1      Standing Expiration Date:      Order Specific Question:  Diet:     Answer:  Clear Liquid  [10]     Physical Exam   Constitutional: She is oriented to person, place, and time. She appears well-developed and well-nourished. No distress.   HENT:   Head: Normocephalic and atraumatic.   Mouth/Throat: Oropharynx is clear and moist.   Eyes: EOM are normal. Pupils are equal, round, and reactive to light. Right eye exhibits no discharge. Left eye exhibits no discharge.   Neck: Neck supple.   Cardiovascular: Normal rate.  An irregularly irregular rhythm present.   Pulmonary/Chest: Effort normal and breath sounds normal.   Abdominal: Soft. She exhibits distension. There is no tenderness.   decreased   Musculoskeletal: She exhibits tenderness (R). She exhibits no edema (R).   LLE splinted and heavily dressed   Neurological: She is alert and oriented to person, place, and time. No cranial nerve deficit.   Skin: Skin is warm and dry. She is not diaphoretic.   Psychiatric: She has a normal mood and affect.   Nursing note and vitals reviewed.      Recent Labs      07/28/17   1900  07/29/17   0308   WBC  10.3  9.2   RBC  4.17*  3.86*   HEMOGLOBIN  12.1  10.7*   HEMATOCRIT  35.0*  33.7*   MCV  83.9  87.3   MCH  29.0  27.7   MCHC  34.6  31.8*   RDW  45.7  49.1   PLATELETCT  371  353   MPV  8.8*  9.1     Recent Labs      07/28/17   1948  07/29/17   0308   SODIUM  121*  121*   POTASSIUM  3.8  3.9   CHLORIDE  89*  89*   CO2  22  26   GLUCOSE  151*  147*   BUN  14  13   CREATININE  0.97  0.86   CALCIUM  8.6  8.4*     Recent Labs      07/28/17   1900   APTT  34.9   INR  1.00     Recent Labs      07/28/17   1900   BNPBTYPENAT  195*              Assessment/Plan     Trimalleolar fracture (present on admission)  Assessment & Plan  Status post OR 7/29. Likely she will require skilled rehab    Chronic atrial fibrillation (CMS-HCC) (present on admission)  Assessment & Plan  No longer tachycardic    UTI (urinary tract infection) (present on admission)  Assessment & Plan  Patient given Rocephin in the emergency department, I have  continued this for a total of 3 days. Given that in this regional area urinary tract infections have about a 20% resistance rate to Septra we will elect to treat with short courses Rocephin instead.  Agree with above especially since she's still symptomatic    Type II diabetes mellitus (CMS-[facility]) (present on admission)  Assessment & Plan  Sugars okay thus far (182) she is nauseated so her oral intake is compromised with adjust insulin etc. as needed over the next couple of days    Hypertension (present on admission)  Assessment & Plan  Continue metoprolol    Hyperlipidemia (present on admission)  Assessment & Plan  Continue pravastatin.    Osteoporosis (present on admission)  Assessment & Plan  On Fosamax as an outpatient, hold this while healing from acute fractures.      Medications reviewed and Labs reviewed          DVT prophylaxis - mechanical: SCDs  Ulcer prophylaxis: Not indicated    Assessed for rehab: Patient was assess for and/or received rehabilitation services during this hospitalization

## 2017-07-29 NOTE — CARE PLAN
Problem: Safety  Goal: Will remain free from injury  Outcome: PROGRESSING AS EXPECTED  Education provided to patient on using the call bell and not to get up with out assistance.      Bed left in low position.  Bed and Chair alarms used.  Hourly rounding completed.  Personal Items left within reach.      Goal: Will remain free from falls  Outcome: PROGRESSING AS EXPECTED  Pt educated on the importance of CALL BEFORE YOU FALL.  Call bell and all necessary items in reach.  Hourly rounding focusing on the 3Ps completed by this RN with the assistance of Hisvivian CP        Problem: Infection  Goal: Will remain free from infection  Outcome: PROGRESSING AS EXPECTED  Education provided on Antibiotics, Wound Care, Hand Washing and Labs as applicable to patient.

## 2017-07-29 NOTE — PROGRESS NOTES
"1440: Patient was complaining of increased pain even after the dose of oxy and the dilaudid injection. Patient stated she was \"going in and out\" since the pain was so bad, was alert and oriented during interaction though. Patient was given the additional 2mg of diluadid as seen in MAR  "

## 2017-07-29 NOTE — OP REPORT
OPERATIVE NOTE     DATE OF SURGERY: 7/29/2017            PRE-OP DIAGNOSIS: Left trimalleolar ankle fracture            POST-OP DIAGNOSIS: Left trimalleolar ankle fracture            PROCEDURE:   1. Open treatment with internal fixation of left trimalleolar ankle fracture with fixation   of posterior lip  2. Physician applied stress examination of left ankle under fluoroscopy      SURGEON: Ashwin Maier M.D.            ANESTHESIOLOGIST:  Deon Jefferson M.D.            ANESTHESIA: General and regional     ASSISTANT: Megan Huerta CFA            ESTIMATED BLOOD LOSS: minimal            TOURNIQUET TIME:  90 minutes at 250mmHg            IMPLANTS: Synthes locking 1/3 tubular plate and screws, Synthes 4.0mm partially    threaded cannulated screws            COMPLICATIONS: none known            TECHNIQUE: Patient was met in the preoperative holding area. Her surgical site was signed and her consent was confirmed to be accurate. she was taken back to the operating room and a regional anesthetic was administered by Dr. Jefferson at my request for postoperative pain control. General anesthesia was then induced. Ancef was administered. The left lower extremity was provisionally cleansed with alcohol. A tourniquet was applied to left thigh. The left lower extremity was then prepped and draped in the usual sterile fashion. Formal timeout was performed to confirm confirm patient's correct name, correct site, correct procedure and laterality. The limb wasn't exsanguinated with an Esmarch and the tourniquet was inflated to 250 mmHg. A lateral incision was made centered over the fibula fracture with a scalpel down through skin proximally the superficial peroneal nerve was identified and protected. The fascia of the lateral compartment was incised and dissection was carried down to the fracture site. Was an oblique fracture with comminution and rotational displacement. I was ultimately able to sufficiently reduce the fracture with  two-point reduction forceps and placed a 2.7 mm lag screw just for provisional fixation given the difficulty of placing a clamp fixation across this area of comminution. A 2.7 mm lag screw was able to stabilize the fracture provisionally and I was able to place an 8 hole one third tubular locking plate across the fracture and fixed it proximally with comminution of nonlocking and locking screws and distally with comminution locking and nonlocking screws. I removed the 2.7 mm lag screw to allow the construct function as a bridge plate.  I turned my attention to the medial malleolus made a longitudinal incision over the fracture I mobilize the saphenous vein anteriorly and protected it. The fracture was reduced with a two-point reduction forceps and a placed guide pins for a total of two 4.0 mm partially cannulated screws across fracture. The guide pins were comfortably except loop positioned and I sequentially placed both screws, which had good bony purchase and were comfortably except we positioned. I then turned my attention the posterior malleolus fracture which was well aligned. Given her poor bone quality felt she would benefit from fixation stabilization of her posterior malleolus fracture. I placed 2 guide pins for 4.0 mm cannulated screws from anterior to posterior across fracture site and advanced them posteriorly through the skin. I then measured and sequentially placed a total of two 4.0 mm partially threaded cannulated screws with washers from posterior to anterior stabilize the posterior malleolus fragment. The screws were comfortably acceptably aligned and well positioned. I then applied a stress examination under fluoroscopy of the ankle syndesmosis which confirmed it was stable. The wound was then thoroughly irrigated. Layered closure was performed with 0 Vicryl to Vicryl and skin edges with staples. The posterior percutaneous incisions were reapproximated with 3-0 nylon. The wounds were thoroughly  cleansed and dried and a sterile compressive dressing and a short leg well-padded plaster was applied. The tourniquet was deflated after 90 minutes. The patient was awoke from anesthesia, transferred on the gurney and taken to postanesthesia care unit in stable condition.    Megan Huerta was present for the duration of the procedure and assisted with patient positioning, retracting, wound closure and splint application.    PLAN:  --readmit medicine postop  --NWB LLE in splint  --PT/OT for mobilization  --okay to start heparin tomorrow am, continue SCD  --may need SNF placement  --already receiving ceftriaxone for her UTI

## 2017-07-29 NOTE — ASSESSMENT & PLAN NOTE
No longer tachycardic  Continue metoprolol.  I spoke with Deannajazmyne Ram and her family myself. She has been offerred [facility] by [facility] for [facility] prevention. They had weighed risks and benefits and prefers not to be on anticoagulation. She has opted to be on baby aspirin instead.  Follow up with JONNY Spann Or attending physician at [facility] upon receipt.

## 2017-07-29 NOTE — ASSESSMENT & PLAN NOTE
Patient given Rocephin in the emergency department, on 3 days total therapy. Her symptoms are improving  Completed 5d course of Rocephin

## 2017-07-29 NOTE — ASSESSMENT & PLAN NOTE
Sugars labile no pattern as yet to add long-acting insulin  Resume her oral regimen and SSI at rehab facility  Follow up with YAYO Spann. Or attending physician at [facility] upon receipt.

## 2017-07-29 NOTE — CONSULTS
"Date of Service  7/29/2017    Chief Complaint  Chief Complaint    Patient presents with    •  T-5000 Ankle Injury        Left     •  Other        [facility] \"[facility] ankle, metatarsal fracture left, UTI, asymptomatic hyponatremia\".          History of Presenting Illness  86 y.o. female who presented 7/28/2017 with ground-level fall at home resulting in a trimalleolar fracture of the left ankle. The patient was diagnosed with a urinary tract infection 2 days ago and was started on Septra. She started out at [facility] and they sent her home thinking that she could follow-up with orthopedics outpatient but after further review decided that the case was more complex and she did need orthopedic follow-up ASAP. [facility] informed [facility] they couldn't handle her case as they didn't have an orthopedic surgeon on staff. Therefore she came here for further evaluation and orthopedics consultation. She was evaluated for hypertension and TIA symptoms about a week ago as well with negative workup.  Dr. Farfan was consulted and asked me to assume her care. She denies other injuries. She is in a short leg splint.      Primary Care Physician  Mark Sommers M.D.    Code Status  Full code    Review of Systems  Review of Systems   Constitutional: Negative.  Negative for fever, chills, weight loss and malaise/fatigue.   HENT: Negative.    Respiratory: Negative.  Negative for cough and shortness of breath.    Cardiovascular: Negative.  Negative for chest pain and leg swelling.   Gastrointestinal: Positive for nausea and vomiting. Negative for abdominal pain.   Genitourinary: Recent UTI being treated.  Negative for dysuria and flank pain.   Musculoskeletal: Positive for joint pain. Negative for myalgias and back pain.   Neurological: Negative.  Negative for dizziness, loss of consciousness and weakness.   Endo/Heme/Allergies: Negative.  Does not bruise/bleed easily. "   Psychiatric/Behavioral: Negative.  Negative for depression. The patient is not nervous/anxious.    All other systems reviewed and are negative.          Past Medical History  Past Medical History    Diagnosis  Date    •  Hypertension      •  Type II or unspecified type diabetes mellitus without mention of complication, not stated as uncontrolled      •  Hypercholesteremia      •  Poor historian      •  Chronic atrial fibrillation (CMS-HCC)      •  Osteoporosis          Surgical History  Past Surgical History    Procedure  Laterality  Date    •  Mastectomy    left    •  Lobectomy            right lung -age 25    •  Tonsillectomy and adenoidectomy        •  Mastoidectomy        •  Appendectomy        •  Cholecystectomy            Medications  No current facility-administered medications on file prior to encounter.        Current Outpatient Prescriptions on File Prior to Encounter    Medication  Sig  Dispense  Refill    •  HYDROmorphone (DILAUDID) 2 MG Tab  Take 0.5-2 Tabs by mouth every 6 hours as needed for Severe Pain.  7 Tab  0    •  sulfamethoxazole-trimethoprim (BACTRIM DS) 800-160 MG tablet  Take 1 Tab by mouth 2 times a day for 5 days.  10 Tab  0    •  alendronate (FOSAMAX) 70 MG Tab  Take 70 mg by mouth every Monday.        •  metformin (GLUCOPHAGE) 1000 MG tablet  Take 1,000 mg by mouth every morning.        •  glipiZIDE (GLUCOTROL) 5 MG Tab  Take 5 mg by mouth every morning.        •  pravastatin (PRAVACHOL) 40 MG tablet  Take 40 mg by mouth every evening.              Family History   Family History     No family history on file.       Social History  Social History    Substance Use Topics    •  Smoking status:  Former Smoker    •  Smokeless tobacco:  Never Used    •  Alcohol Use:  Yes          Comment: rare        Allergies  Allergies    Allergen  Reactions    •  Morphine And Related [Codeine]  Nausea    •  Levaquin  Rash        Patient can't remember what happens when she takes it.            Physical  Exam    Laboratory    Hemodynamics  Filed Vitals:    07/28/17 2249 07/29/17 0038 07/29/17 0518 07/29/17 0721   BP: 164/46 143/52 163/68 175/61   Pulse: 64 73 89    Temp:  36.1 °C (96.9 °F) 36.1 °C (96.9 °F) 36.1 °C (97 °F)   Resp: 16 16 16 18   Height:       Weight:       SpO2:  100% 99% 99%             Physical Exam   Constitutional: NAD, alert and oriented, following commands  HEENT:  NCAT, MMM, nonicteric sclera  Cardiovascular: JVP noted, irregular peripheral pulses  Pulmonary/Chest: symmetric, unlabored breathing   Abdominal: Soft. nondistended  Musculoskeletal: LLE- splint in place, flexing/extending toes with BCR and SILT there, NTTP proximal to splint, no knee effusion, no pain with log roll.  BUE/RLE-no evidence of traumatic deformity, NTTP and grossly NVI   Recent Labs       07/28/17 1900    WBC   10.3    RBC   4.17*    HEMOGLOBIN   12.1    HEMATOCRIT   35.0*    MCV   83.9    MCH   29.0    MCHC   34.6    RDW   45.7    PLATELETCT   371    MPV   8.8*      Recent Labs       07/28/17 1948    SODIUM   121*    POTASSIUM   3.8    CHLORIDE   89*    CO2   22    GLUCOSE   151*    BUN   14    CREATININE   0.97    CALCIUM   8.6      Recent Labs       07/28/17 1948    ALTSGPT   15    ASTSGOT   16    ALKPHOSPHAT   51    TBILIRUBIN   0.3    LIPASE   24    GLUCOSE   151*      Recent Labs       07/28/17 1900    APTT   34.9    INR   1.00      Recent Labs       07/28/17 1900    BNPBTYPENAT   195*          Lab Results    Component  Value  Date      TROPONINI  <0.01  07/28/2017        Imaging  Trimalleolar fracture of left ankle and metatarsal fracture.    Chest xray no acute findings    Assessment:       86yoF with left trimalleolar ankle fracture.  Admitted to medical service.  Has UTI and asymptomatic hyponatremia.  Osteoporosis    Recommendations:   --I discussed these findings with the patient and recommend ORIF.  We discussed risks of surgery including infection, loss of fixation, wound healing problems,  implant prominence, DVT/PE, ankle stiffness and general risks of anesthesia including MI/CVA/death.  She expressed understanding and wishes to proceed with surgery when possible.  --continue NPO  --continue NWB LLE in splint  --planning surgery for this am      A

## 2017-07-29 NOTE — ED NOTES
"Chief Complaint   Patient presents with   • T-5000 Ankle Injury     Left    • Other     [facility] \"[facility]olar fracture left ankle, metatarsal fracture left, UTI, asymptomatic hyponatremia\".     Pt was [facility], and now transferred here for Orthopedic Surgical Consult.     "

## 2017-07-29 NOTE — PROGRESS NOTES
86yoF with left trimalleolar ankle fx s/p ORIF today.    S: Having some ankle pain but better with PO meds.  Family at bedside.    O:  Filed Vitals:    07/29/17 1215 07/29/17 1245 07/29/17 1315 07/29/17 1600   BP: 145/61 146/58 143/53 155/63   Pulse: 81 75 75 82   Temp: 36.4 °C (97.5 °F) 36.5 °C (97.7 °F) 36.2 °C (97.1 °F) 36.2 °C (97.1 °F)   Resp: 14 16 16 16   Height:       Weight:       SpO2: 97% 97% 93% 93%     Exam:  General-NAD, alert and oriented, following commands  LLE-splint c/d/i, flexing/extending toes, SILT and BCT in toes    A: 86yoF with left trimalleolar ankle fx s/p ORIF 7/29.    Recs:  --NWB LLE in splint  --PT/OT for mobilization  --okay to start heparin tomorrow am, continue SCD  --may need SNF placement  --fu 2 weeks postop

## 2017-07-29 NOTE — OR SURGEON
Operative Report    PreOp Diagnosis: Left trimalleolar ankle fracture    PostOp Diagnosis: same    Procedure(s):  ANKLE ORIF - Wound Class: Clean    Surgeon(s):  Ashwin Maier M.D.    Anesthesiologist/Type of Anesthesia:  Anesthesiologist: Deon Jefferson M.D./General    Surgical Staff:  Assistant: Megan Huerta C.N.A.  Circulator: Meliza Ackerman R.N.  Relief Circulator: Saba Cleary R.N.  Scrub Person: Norris Foss  Radiology Technologist: Matthieu June    Specimens: none    Estimated Blood Loss: minimal    Findings: see dictation    Complications: none known    PLAN:  --readmit medicine postop  --NWB LLE in splint  --PT/OT for mobilization  --okay to start heparin tomorrow am, continue SCD  --may need SNF placement        7/29/2017 9:48 AM Ashwin Maier

## 2017-07-29 NOTE — ED NOTES
Assist RN:  Pt currently vomiting stomach contents.  Admitting MD Dr Briceño at bedside for new orders.  RN administered Zofran and pt given new emesis bag.

## 2017-07-29 NOTE — ED NOTES
Med rec completed per pt and [facility]  Allergies reviewed  Pt started a 5 day course of Bactrim on 7/27/17  Pt has not had any of her medications today

## 2017-07-30 ENCOUNTER — APPOINTMENT (OUTPATIENT)
Dept: RADIOLOGY | Facility: MEDICAL CENTER | Age: 82
DRG: 492 | End: 2017-07-30
Attending: INTERNAL MEDICINE
Payer: COMMERCIAL

## 2017-07-30 PROBLEM — K59.00 CONSTIPATED: Status: ACTIVE | Noted: 2017-07-30

## 2017-07-30 PROBLEM — E87.1 HYPONATREMIA: Status: ACTIVE | Noted: 2017-07-30

## 2017-07-30 PROBLEM — R93.89 ABNORMAL CXR: Status: ACTIVE | Noted: 2017-07-30

## 2017-07-30 PROBLEM — J96.21 ACUTE ON CHRONIC RESPIRATORY FAILURE WITH HYPOXEMIA (HCC): Status: ACTIVE | Noted: 2017-07-30

## 2017-07-30 LAB
GLUCOSE BLD-MCNC: 134 MG/DL (ref 65–99)
GLUCOSE BLD-MCNC: 135 MG/DL (ref 65–99)
GLUCOSE BLD-MCNC: 207 MG/DL (ref 65–99)
GLUCOSE BLD-MCNC: 276 MG/DL (ref 65–99)

## 2017-07-30 PROCEDURE — A9270 NON-COVERED ITEM OR SERVICE: HCPCS | Performed by: HOSPITALIST

## 2017-07-30 PROCEDURE — 99233 SBSQ HOSP IP/OBS HIGH 50: CPT | Performed by: INTERNAL MEDICINE

## 2017-07-30 PROCEDURE — 700101 HCHG RX REV CODE 250: Performed by: HOSPITALIST

## 2017-07-30 PROCEDURE — 90670 PCV13 VACCINE IM: CPT | Performed by: INTERNAL MEDICINE

## 2017-07-30 PROCEDURE — 90471 IMMUNIZATION ADMIN: CPT

## 2017-07-30 PROCEDURE — 82962 GLUCOSE BLOOD TEST: CPT

## 2017-07-30 PROCEDURE — 3E0234Z INTRODUCTION OF SERUM, TOXOID AND VACCINE INTO MUSCLE, PERCUTANEOUS APPROACH: ICD-10-PCS | Performed by: INTERNAL MEDICINE

## 2017-07-30 PROCEDURE — 700105 HCHG RX REV CODE 258: Performed by: HOSPITALIST

## 2017-07-30 PROCEDURE — G8979 MOBILITY GOAL STATUS: HCPCS | Mod: CI

## 2017-07-30 PROCEDURE — 360976 HOYER LARGE SLING UP TO 300LBS: Performed by: INTERNAL MEDICINE

## 2017-07-30 PROCEDURE — 71260 CT THORAX DX C+: CPT

## 2017-07-30 PROCEDURE — 770006 HCHG ROOM/CARE - MED/SURG/GYN SEMI*

## 2017-07-30 PROCEDURE — G8978 MOBILITY CURRENT STATUS: HCPCS | Mod: CK

## 2017-07-30 PROCEDURE — 97162 PT EVAL MOD COMPLEX 30 MIN: CPT

## 2017-07-30 PROCEDURE — 700111 HCHG RX REV CODE 636 W/ 250 OVERRIDE (IP): Performed by: INTERNAL MEDICINE

## 2017-07-30 PROCEDURE — 700117 HCHG RX CONTRAST REV CODE 255: Performed by: INTERNAL MEDICINE

## 2017-07-30 PROCEDURE — 700102 HCHG RX REV CODE 250 W/ 637 OVERRIDE(OP): Performed by: INTERNAL MEDICINE

## 2017-07-30 PROCEDURE — 700111 HCHG RX REV CODE 636 W/ 250 OVERRIDE (IP): Performed by: HOSPITALIST

## 2017-07-30 PROCEDURE — 700102 HCHG RX REV CODE 250 W/ 637 OVERRIDE(OP): Performed by: HOSPITALIST

## 2017-07-30 PROCEDURE — A9270 NON-COVERED ITEM OR SERVICE: HCPCS | Performed by: INTERNAL MEDICINE

## 2017-07-30 RX ORDER — TRAMADOL HYDROCHLORIDE 50 MG/1
50 TABLET ORAL EVERY 6 HOURS PRN
Status: DISCONTINUED | OUTPATIENT
Start: 2017-07-30 | End: 2017-08-01 | Stop reason: HOSPADM

## 2017-07-30 RX ORDER — AMOXICILLIN 250 MG
2 CAPSULE ORAL 2 TIMES DAILY
Status: DISCONTINUED | OUTPATIENT
Start: 2017-07-30 | End: 2017-08-01 | Stop reason: HOSPADM

## 2017-07-30 RX ORDER — POLYETHYLENE GLYCOL 3350 17 G/17G
1 POWDER, FOR SOLUTION ORAL DAILY
Status: DISCONTINUED | OUTPATIENT
Start: 2017-07-30 | End: 2017-08-01 | Stop reason: HOSPADM

## 2017-07-30 RX ORDER — BISACODYL 10 MG
10 SUPPOSITORY, RECTAL RECTAL
Status: DISCONTINUED | OUTPATIENT
Start: 2017-07-30 | End: 2017-08-01 | Stop reason: HOSPADM

## 2017-07-30 RX ORDER — DILTIAZEM HYDROCHLORIDE 5 MG/ML
20 INJECTION INTRAVENOUS ONCE
Status: COMPLETED | OUTPATIENT
Start: 2017-07-31 | End: 2017-07-31

## 2017-07-30 RX ORDER — SODIUM CHLORIDE 1 G/1
1 TABLET ORAL
Status: DISCONTINUED | OUTPATIENT
Start: 2017-07-30 | End: 2017-08-01 | Stop reason: HOSPADM

## 2017-07-30 RX ADMIN — INSULIN LISPRO 5 UNITS: 100 INJECTION, SOLUTION INTRAVENOUS; SUBCUTANEOUS at 21:07

## 2017-07-30 RX ADMIN — STANDARDIZED SENNA CONCENTRATE AND DOCUSATE SODIUM 2 TABLET: 8.6; 5 TABLET, FILM COATED ORAL at 08:37

## 2017-07-30 RX ADMIN — OXYCODONE HYDROCHLORIDE 10 MG: 10 TABLET ORAL at 08:43

## 2017-07-30 RX ADMIN — ASPIRIN 81 MG: 81 TABLET, CHEWABLE ORAL at 08:36

## 2017-07-30 RX ADMIN — STANDARDIZED SENNA CONCENTRATE AND DOCUSATE SODIUM 2 TABLET: 8.6; 5 TABLET, FILM COATED ORAL at 21:03

## 2017-07-30 RX ADMIN — OXYBUTYNIN CHLORIDE 2.5 MG: 5 TABLET ORAL at 08:37

## 2017-07-30 RX ADMIN — OXYCODONE HYDROCHLORIDE 10 MG: 10 TABLET ORAL at 00:12

## 2017-07-30 RX ADMIN — OXYCODONE HYDROCHLORIDE 10 MG: 10 TABLET ORAL at 05:43

## 2017-07-30 RX ADMIN — PNEUMOCOCCAL 13-VALENT CONJUGATE VACCINE 0.5 ML: 2.2; 2.2; 2.2; 2.2; 2.2; 4.4; 2.2; 2.2; 2.2; 2.2; 2.2; 2.2; 2.2 INJECTION, SUSPENSION INTRAMUSCULAR at 06:26

## 2017-07-30 RX ADMIN — OXYBUTYNIN CHLORIDE 2.5 MG: 5 TABLET ORAL at 21:03

## 2017-07-30 RX ADMIN — LABETALOL HYDROCHLORIDE 10 MG: 5 INJECTION, SOLUTION INTRAVENOUS at 21:36

## 2017-07-30 RX ADMIN — INSULIN LISPRO 3 UNITS: 100 INJECTION, SOLUTION INTRAVENOUS; SUBCUTANEOUS at 11:02

## 2017-07-30 RX ADMIN — OXYCODONE HYDROCHLORIDE 10 MG: 10 TABLET ORAL at 13:44

## 2017-07-30 RX ADMIN — METOPROLOL SUCCINATE 25 MG: 25 TABLET, EXTENDED RELEASE ORAL at 08:36

## 2017-07-30 RX ADMIN — SODIUM CHLORIDE: 9 INJECTION, SOLUTION INTRAVENOUS at 00:00

## 2017-07-30 RX ADMIN — LABETALOL HYDROCHLORIDE 10 MG: 5 INJECTION, SOLUTION INTRAVENOUS at 00:01

## 2017-07-30 RX ADMIN — IOHEXOL 75 ML: 350 INJECTION, SOLUTION INTRAVENOUS at 15:17

## 2017-07-30 RX ADMIN — PRAVASTATIN SODIUM 40 MG: 10 TABLET ORAL at 21:02

## 2017-07-30 RX ADMIN — CEFTRIAXONE 2 G: 2 INJECTION, POWDER, FOR SOLUTION INTRAMUSCULAR; INTRAVENOUS at 08:36

## 2017-07-30 RX ADMIN — MAGNESIUM HYDROXIDE 30 ML: 400 SUSPENSION ORAL at 18:44

## 2017-07-30 RX ADMIN — SODIUM CHLORIDE TAB 1 GM 1 G: 1 TAB at 17:13

## 2017-07-30 RX ADMIN — OXYCODONE HYDROCHLORIDE 10 MG: 10 TABLET ORAL at 17:13

## 2017-07-30 ASSESSMENT — ENCOUNTER SYMPTOMS
VOMITING: 0
ABDOMINAL PAIN: 0
CONSTIPATION: 1
WHEEZING: 0
SHORTNESS OF BREATH: 0
DIARRHEA: 0
HEADACHES: 0
NAUSEA: 0
NERVOUS/ANXIOUS: 0
WEAKNESS: 0
COUGH: 0
LOSS OF CONSCIOUSNESS: 0
DIZZINESS: 0

## 2017-07-30 ASSESSMENT — COGNITIVE AND FUNCTIONAL STATUS - GENERAL
MOBILITY SCORE: 12
TURNING FROM BACK TO SIDE WHILE IN FLAT BAD: A LITTLE
STANDING UP FROM CHAIR USING ARMS: A LOT
SUGGESTED CMS G CODE MODIFIER MOBILITY: CL
MOVING TO AND FROM BED TO CHAIR: A LOT
MOVING FROM LYING ON BACK TO SITTING ON SIDE OF FLAT BED: A LOT
CLIMB 3 TO 5 STEPS WITH RAILING: TOTAL
WALKING IN HOSPITAL ROOM: A LOT

## 2017-07-30 ASSESSMENT — COPD QUESTIONNAIRES
HAVE YOU SMOKED AT LEAST 100 CIGARETTES IN YOUR ENTIRE LIFE: YES
DO YOU EVER COUGH UP ANY MUCUS OR PHLEGM?: YES, A FEW DAYS A WEEK OR MONTH
COPD SCREENING SCORE: 7
DURING THE PAST 4 WEEKS HOW MUCH DID YOU FEEL SHORT OF BREATH: SOME OF THE TIME

## 2017-07-30 ASSESSMENT — LIFESTYLE VARIABLES
EVER_SMOKED: YES
EVER_SMOKED: YES
ALCOHOL_USE: NO

## 2017-07-30 ASSESSMENT — PATIENT HEALTH QUESTIONNAIRE - PHQ9
SUM OF ALL RESPONSES TO PHQ9 QUESTIONS 1 AND 2: 0
SUM OF ALL RESPONSES TO PHQ QUESTIONS 1-9: 0
2. FEELING DOWN, DEPRESSED, IRRITABLE, OR HOPELESS: NOT AT ALL
1. LITTLE INTEREST OR PLEASURE IN DOING THINGS: NOT AT ALL

## 2017-07-30 ASSESSMENT — PAIN SCALES - GENERAL
PAINLEVEL_OUTOF10: 8
PAINLEVEL_OUTOF10: 3
PAINLEVEL_OUTOF10: 8
PAINLEVEL_OUTOF10: 7
PAINLEVEL_OUTOF10: 5
PAINLEVEL_OUTOF10: 7
PAINLEVEL_OUTOF10: 8
PAINLEVEL_OUTOF10: 7

## 2017-07-30 ASSESSMENT — GAIT ASSESSMENTS: GAIT LEVEL OF ASSIST: UNABLE TO PARTICIPATE

## 2017-07-30 NOTE — THERAPY
"Physical Therapy Evaluation completed.   Bed Mobility:  Supine to Sit: Minimal Assist (with rail )  Transfers: Sit to Stand: Moderate Assist (x2 with bed elevated. )  Gait: Level Of Assist: Unable to Participate with Front-Wheel Walker       Plan of Care: Will benefit from Physical Therapy 3 times per week  Discharge Recommendations: Equipment: Will Continue to Assess for Equipment Needs. Post-acute therapy Discharge to a transitional care facility for continued skilled therapy services.    See \"Rehab Therapy-Acute\" Patient Summary Report for complete documentation.     "

## 2017-07-30 NOTE — CARE PLAN
Problem: Discharge Barriers/Planning  Goal: Patient’s continuum of care needs will be met  Outcome: PROGRESSING AS EXPECTED  Physician wanted patient OOB today and urinating within 6 hours post surgery. The patient voided several times and ambulated with a max assist of two to the bedside cammode and then to the bedside chair. Her leg is elevated at the bedside chair as well per MD orders. These are the expected steps for today.    Problem: Pain Management  Goal: Pain level will decrease to patient’s comfort goal  Outcome: PROGRESSING AS EXPECTED  Patient was complaining of severe pain upon arrival to floor. Oxycodone at 5mg was given and 0.5 of dilaudid shortly after her pain did not subside. The patient was still complaining of 9/10 pain, and 2mg of dilaudid was given. That dose along with repositioning of her leg to only being elevated on one pillow instead of 2 assisted in resolving the patient's pain. The patient is now sitting at the bedside chair calming and talking with family and friends.

## 2017-07-30 NOTE — CONSULTS
Pulmonary Consultation Note    Name:Deanna Ram  MRN:[id number]  Date of Service: 7/30/17  Referring physician: MARTITA Petersen.*    Chief Complaint: Nocturnal hypoxia    History of Present Illness:  <HPHISTORY>  87 yo pleasant female who is admitted on 7/28/17 following a GLF. Found to have a left trimalleolar ankle fx and is s/p ORIF on 7/29.  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 She was not treated with antimicrobials. She was recently prescribed nocturnal oxygen at 2LPM for unclear reason and its unclear if any testing was done. She does not snore, no witnessed apneic events by her partner, she wakes up 3x every night for nocturia. No daytime somnolence or naps. She is a 25PY smoker, quit >35yrs ago. While inpatient here, was found to be hypoxic while sleeping on 2LPM, increased with slow improvement. In daytime, she has been on 2-3LPM and SPO2 is %. She denies any respiratory complaints. Uses 2 pillows at night as she has kyphosis.    Past Medical History:   has a past medical history of Hypertension; Type II or unspecified type diabetes mellitus without mention of complication, not stated as uncontrolled; Hypercholesteremia; Poor historian; Chronic atrial fibrillation (CMS-HCC); and Osteoporosis.    Past Surgical History:   has past surgical history that includes mastectomy (left); lobectomy; tonsillectomy and adenoidectomy; mastoidectomy; appendectomy; cholecystectomy; and ankle orif (Left, 7/29/2017).    Allergies:  Morphine and related and Levaquin    Medications:  No current facility-administered medications on file prior to encounter.     Current Outpatient Prescriptions on File Prior to Encounter   Medication Sig Dispense Refill   • HYDROmorphone (DILAUDID) 2 MG Tab Take 0.5-2 Tabs by mouth every 6 hours as needed for Severe Pain. 7 Tab 0   • sulfamethoxazole-trimethoprim (BACTRIM DS) 800-160 MG tablet Take 1 Tab by mouth 2 times a day for 5 days.  10 Tab 0   • alendronate (FOSAMAX) 70 MG Tab Take 70 mg by mouth every Monday.     • metformin (GLUCOPHAGE) 1000 MG tablet Take 1,000 mg by mouth every morning.     • glipiZIDE (GLUCOTROL) 5 MG Tab Take 5 mg by mouth every morning.     • pravastatin (PRAVACHOL) 40 MG tablet Take 40 mg by mouth every evening.         Social History:   reports that she has quit smoking. She has never used smokeless tobacco. She reports that she drinks alcohol. She reports that she does not use illicit drugs.    Family History:  family history is not on file.    ROS:  As per HPI. The rest of systems are negative per AMA and CMMS guidelines.    Physical Examination:  Physical Exam   Constitutional: She is oriented to person, place, and time. She appears well-developed and well-nourished.   HENT:   Head: Normocephalic and atraumatic.   Eyes: Conjunctivae are normal. Pupils are equal, round, and reactive to light.   Neck: Normal range of motion. Neck supple. No tracheal deviation present. No thyromegaly present.   Cardiovascular: Normal rate and regular rhythm.    Pulmonary/Chest: Effort normal and breath sounds normal. No respiratory distress. She has no wheezes.   Abdominal: Soft. Bowel sounds are normal. She exhibits no distension.   Musculoskeletal: Normal range of motion. She exhibits no edema.   Neurological: She is alert and oriented to person, place, and time. No cranial nerve deficit.   Skin: Skin is warm and dry. She is not diaphoretic.       Laboratories:  Recent Labs      07/28/17   1900  07/29/17   0308   WBC  10.3  9.2   RBC  4.17*  3.86*   HEMOGLOBIN  12.1  10.7*   HEMATOCRIT  35.0*  33.7*   MCV  83.9  87.3   MCH  29.0  27.7   RDW  45.7  49.1   PLATELETCT  371  353   MPV  8.8*  9.1   NEUTSPOLYS  74.30*  72.80*   LYMPHOCYTES  12.60*  13.40*   MONOCYTES  10.90  11.30   EOSINOPHILS  1.20  1.50   BASOPHILS  0.30  0.50     Recent Labs      07/28/17   1948  07/29/17   0308   SODIUM  121*  121*   POTASSIUM  3.8  3.9   CHLORIDE   89*  89*   CO2  22  26   GLUCOSE  151*  147*   BUN  14  13       CXR images are reviewed by me. There is an elevated R hemidiaphragm. Additionally, a RML rounded opacity is present.    Impression:  Active Hospital Problems    Diagnosis   • Hyponatremia [E87.1]   • Abnormal CXR [R93.8]   • Acute on chronic respiratory failure with hypoxemia (CMS-[facility]) [J96.21]   • Trimalleolar fracture [S82.853A]   • Chronic atrial fibrillation (CMS-HCC) [I48.2]   • UTI (urinary tract infection) [N39.0]   • Type II diabetes mellitus (CMS-HCC) [E11.9]   • Hypertension [I10]   • Hyperlipidemia [E78.5]   • Osteoporosis [M81.0]     Nocturnal hypoxia: presumably from atelectasis as post-op. Hx of RUL lobectomy and currently with radiographic evidence of R hemidiaphragm elevation. This doesn't typically cause hypoxia.   Continue IS, OOB to chair, mobilize as tolerated  Will plan for nocturnal oximetry study  Not candidate for NIPPV at this time as low risk for BRITNEY- can proceed with outpt PSG if needed      Hyponatremia: Chronic in nature according to patient  Etiology unclear, not on thiazides  ?lung source, CT chest pending    Thank you for this consultation. We will continue to follow along.    Mauri Garcia MD  [facility]

## 2017-07-30 NOTE — PROGRESS NOTES
Spoke with kitchen concerning patients 1000mL fluid restriction. The kitchen sends up 500 mL a day split between breakfast lunch and dinner. Will continue nursing care with 500mL given/available by day shift and 500mL given/available by night shift.

## 2017-07-30 NOTE — CARE PLAN
Problem: Urinary Elimination:  Goal: Ability to reestablish a normal urinary elimination pattern will improve  Outcome: PROGRESSING SLOWER THAN EXPECTED  Patient has frequent urgency, two episode of incontinence this shift, patient unable to make it to bed pan twice. Barrier cream applied and changing peripads as well as perineal care as needed. No skin breakdown observed on perinuem or buttocks.     Problem: Mobility  Goal: Risk for activity intolerance will decrease  Intervention: Encourage patient to increase activity level in collaboration with Interdisciplinary Team  Received report from KAYY Gutierrez, had discussed patient is two max assist, NWB on LLE and unable to stand on her own with her RLE. Assessed muscle strength on right leg, strength 4/5, patient requested to be carried back into bed. Explained to patient to work on ambulation and increasing strength, patient refused to stand up and attempt to pivot back into bed. Called traction, obtained sling and kim lift, transferred patient safely back into bed. PT/OT ordered, explained plan of care, patient verbalized understanding.

## 2017-07-30 NOTE — PROGRESS NOTES
86yoF with left trimalleolar ankle fx s/p ORIF 7/29.    S: Still having some ankle pain but meds helping.  Sitting in chair out of bed.    O:  Filed Vitals:    07/30/17 0100 07/30/17 0350 07/30/17 0734 07/30/17 0800   BP: 131/47 142/51  143/51   Pulse: 89 93 91 93   Temp:  35.9 °C (96.7 °F)  36.7 °C (98 °F)   Resp:  16 18 18   Height:       Weight:       SpO2:  93% 99% 96%     Exam:  General-NAD, alert and oriented, following commands  LLE-splint c/d/i, flexing/extending toes, SILT and BCT in toes    A: 86yoF with left trimalleolar ankle fx s/p ORIF 7/29.    Recs:  --NWB LLE in splint  --PT/OT for mobilization  --okay to start heparin or equivalent until more ambulatory if needed, continue SCD  --will likely need SNF placement  --fu 2 weeks postop

## 2017-07-30 NOTE — PROGRESS NOTES
Paged hospitalist Kelechi Edwards regarding patient's oxygen saturation decreasing to 76 to 80% while sleeping. Attempted to raise the bed higher than 35 degrees as well as increase oxygen to 4 liters, patient continues to desat to low 80s while sleeping. Patient reported that during the day she is on room air and at night she is on 2 liters of oxygen. Patient explains that [facility] 2 liters at night because she had reported breathing problems while sleeping. Received telephone order for RT protocol to determine CPAP qualification.

## 2017-07-31 LAB
ANION GAP SERPL CALC-SCNC: 5 MMOL/L (ref 0–11.9)
BUN SERPL-MCNC: 19 MG/DL (ref 8–22)
CALCIUM SERPL-MCNC: 8.4 MG/DL (ref 8.5–10.5)
CHLORIDE SERPL-SCNC: 96 MMOL/L (ref 96–112)
CO2 SERPL-SCNC: 28 MMOL/L (ref 20–33)
CORTIS SERPL-MCNC: 25.1 UG/DL (ref 0–23)
CREAT SERPL-MCNC: 0.9 MG/DL (ref 0.5–1.4)
ERYTHROCYTE [DISTWIDTH] IN BLOOD BY AUTOMATED COUNT: 51.1 FL (ref 35.9–50)
GFR SERPL CREATININE-BSD FRML MDRD: 59 ML/MIN/1.73 M 2
GLUCOSE BLD-MCNC: 167 MG/DL (ref 65–99)
GLUCOSE BLD-MCNC: 175 MG/DL (ref 65–99)
GLUCOSE BLD-MCNC: 248 MG/DL (ref 65–99)
GLUCOSE SERPL-MCNC: 170 MG/DL (ref 65–99)
HCT VFR BLD AUTO: 29.8 % (ref 37–47)
HGB BLD-MCNC: 9.4 G/DL (ref 12–16)
MCH RBC QN AUTO: 28.4 PG (ref 27–33)
MCHC RBC AUTO-ENTMCNC: 31.5 G/DL (ref 33.6–35)
MCV RBC AUTO: 90 FL (ref 81.4–97.8)
OSMOLALITY SERPL: 282 MOSM/KG H2O (ref 278–298)
OSMOLALITY UR: 456 MOSM/KG H2O (ref 300–900)
PLATELET # BLD AUTO: 300 K/UL (ref 164–446)
PMV BLD AUTO: 9.1 FL (ref 9–12.9)
POTASSIUM SERPL-SCNC: 4.7 MMOL/L (ref 3.6–5.5)
RBC # BLD AUTO: 3.31 M/UL (ref 4.2–5.4)
SODIUM SERPL-SCNC: 129 MMOL/L (ref 135–145)
T4 FREE SERPL-MCNC: 1.49 NG/DL (ref 0.53–1.43)
T4 SERPL-MCNC: 13.1 UG/DL (ref 4–12)
TSH SERPL DL<=0.005 MIU/L-ACNC: 2.62 UIU/ML (ref 0.3–3.7)
WBC # BLD AUTO: 11.5 K/UL (ref 4.8–10.8)

## 2017-07-31 PROCEDURE — 83930 ASSAY OF BLOOD OSMOLALITY: CPT

## 2017-07-31 PROCEDURE — 84443 ASSAY THYROID STIM HORMONE: CPT

## 2017-07-31 PROCEDURE — A9270 NON-COVERED ITEM OR SERVICE: HCPCS | Performed by: INTERNAL MEDICINE

## 2017-07-31 PROCEDURE — 85027 COMPLETE CBC AUTOMATED: CPT

## 2017-07-31 PROCEDURE — 700102 HCHG RX REV CODE 250 W/ 637 OVERRIDE(OP): Performed by: NURSE PRACTITIONER

## 2017-07-31 PROCEDURE — 770020 HCHG ROOM/CARE - TELE (206)

## 2017-07-31 PROCEDURE — A9270 NON-COVERED ITEM OR SERVICE: HCPCS | Performed by: HOSPITALIST

## 2017-07-31 PROCEDURE — 80048 BASIC METABOLIC PNL TOTAL CA: CPT

## 2017-07-31 PROCEDURE — 84439 ASSAY OF FREE THYROXINE: CPT

## 2017-07-31 PROCEDURE — A9270 NON-COVERED ITEM OR SERVICE: HCPCS | Performed by: NURSE PRACTITIONER

## 2017-07-31 PROCEDURE — 82533 TOTAL CORTISOL: CPT

## 2017-07-31 PROCEDURE — 700102 HCHG RX REV CODE 250 W/ 637 OVERRIDE(OP): Performed by: INTERNAL MEDICINE

## 2017-07-31 PROCEDURE — 700111 HCHG RX REV CODE 636 W/ 250 OVERRIDE (IP): Performed by: HOSPITALIST

## 2017-07-31 PROCEDURE — 99232 SBSQ HOSP IP/OBS MODERATE 35: CPT | Performed by: INTERNAL MEDICINE

## 2017-07-31 PROCEDURE — 83935 ASSAY OF URINE OSMOLALITY: CPT

## 2017-07-31 PROCEDURE — 82962 GLUCOSE BLOOD TEST: CPT

## 2017-07-31 PROCEDURE — 700105 HCHG RX REV CODE 258: Performed by: HOSPITALIST

## 2017-07-31 PROCEDURE — 36415 COLL VENOUS BLD VENIPUNCTURE: CPT

## 2017-07-31 PROCEDURE — 700105 HCHG RX REV CODE 258

## 2017-07-31 PROCEDURE — 700111 HCHG RX REV CODE 636 W/ 250 OVERRIDE (IP): Performed by: NURSE PRACTITIONER

## 2017-07-31 PROCEDURE — 700102 HCHG RX REV CODE 250 W/ 637 OVERRIDE(OP): Performed by: HOSPITALIST

## 2017-07-31 RX ORDER — SODIUM CHLORIDE 9 MG/ML
INJECTION, SOLUTION INTRAVENOUS
Status: COMPLETED
Start: 2017-07-31 | End: 2017-07-31

## 2017-07-31 RX ORDER — METOPROLOL SUCCINATE 50 MG/1
50 TABLET, EXTENDED RELEASE ORAL EVERY MORNING
Status: DISCONTINUED | OUTPATIENT
Start: 2017-08-01 | End: 2017-08-01 | Stop reason: HOSPADM

## 2017-07-31 RX ORDER — SODIUM CHLORIDE 9 MG/ML
INJECTION, SOLUTION INTRAVENOUS
Status: ACTIVE
Start: 2017-07-31 | End: 2017-07-31

## 2017-07-31 RX ADMIN — INSULIN LISPRO 3 UNITS: 100 INJECTION, SOLUTION INTRAVENOUS; SUBCUTANEOUS at 11:40

## 2017-07-31 RX ADMIN — OXYBUTYNIN CHLORIDE 2.5 MG: 5 TABLET ORAL at 08:39

## 2017-07-31 RX ADMIN — PRAVASTATIN SODIUM 40 MG: 10 TABLET ORAL at 22:33

## 2017-07-31 RX ADMIN — SODIUM CHLORIDE TAB 1 GM 1 G: 1 TAB at 16:32

## 2017-07-31 RX ADMIN — DILTIAZEM HYDROCHLORIDE 20 MG: 5 INJECTION INTRAVENOUS at 00:36

## 2017-07-31 RX ADMIN — CEFTRIAXONE 2 G: 2 INJECTION, POWDER, FOR SOLUTION INTRAMUSCULAR; INTRAVENOUS at 08:28

## 2017-07-31 RX ADMIN — TRAMADOL HYDROCHLORIDE 50 MG: 50 TABLET, COATED ORAL at 22:33

## 2017-07-31 RX ADMIN — MAGNESIUM HYDROXIDE 30 ML: 400 SUSPENSION ORAL at 22:33

## 2017-07-31 RX ADMIN — OXYBUTYNIN CHLORIDE 2.5 MG: 5 TABLET ORAL at 21:00

## 2017-07-31 RX ADMIN — LABETALOL HYDROCHLORIDE 10 MG: 5 INJECTION, SOLUTION INTRAVENOUS at 15:54

## 2017-07-31 RX ADMIN — INSULIN LISPRO 3 UNITS: 100 INJECTION, SOLUTION INTRAVENOUS; SUBCUTANEOUS at 22:36

## 2017-07-31 RX ADMIN — METOPROLOL SUCCINATE 25 MG: 25 TABLET, EXTENDED RELEASE ORAL at 08:31

## 2017-07-31 RX ADMIN — INSULIN LISPRO 2 UNITS: 100 INJECTION, SOLUTION INTRAVENOUS; SUBCUTANEOUS at 16:32

## 2017-07-31 RX ADMIN — SODIUM CHLORIDE TAB 1 GM 1 G: 1 TAB at 08:39

## 2017-07-31 RX ADMIN — SODIUM CHLORIDE 1000 ML: 9 INJECTION, SOLUTION INTRAVENOUS at 08:33

## 2017-07-31 RX ADMIN — ASPIRIN 81 MG: 81 TABLET, CHEWABLE ORAL at 08:31

## 2017-07-31 RX ADMIN — STANDARDIZED SENNA CONCENTRATE AND DOCUSATE SODIUM 2 TABLET: 8.6; 5 TABLET, FILM COATED ORAL at 08:31

## 2017-07-31 RX ADMIN — STANDARDIZED SENNA CONCENTRATE AND DOCUSATE SODIUM 2 TABLET: 8.6; 5 TABLET, FILM COATED ORAL at 22:33

## 2017-07-31 RX ADMIN — INSULIN LISPRO 2 UNITS: 100 INJECTION, SOLUTION INTRAVENOUS; SUBCUTANEOUS at 06:16

## 2017-07-31 RX ADMIN — SODIUM CHLORIDE TAB 1 GM 1 G: 1 TAB at 11:44

## 2017-07-31 ASSESSMENT — COGNITIVE AND FUNCTIONAL STATUS - GENERAL
TURNING FROM BACK TO SIDE WHILE IN FLAT BAD: A LITTLE
MOVING TO AND FROM BED TO CHAIR: A LOT
STANDING UP FROM CHAIR USING ARMS: A LOT
WALKING IN HOSPITAL ROOM: A LOT
CLIMB 3 TO 5 STEPS WITH RAILING: TOTAL
MOBILITY SCORE: 12
MOVING FROM LYING ON BACK TO SITTING ON SIDE OF FLAT BED: A LOT
SUGGESTED CMS G CODE MODIFIER MOBILITY: CL

## 2017-07-31 ASSESSMENT — PAIN SCALES - GENERAL
PAINLEVEL_OUTOF10: 0
PAINLEVEL_OUTOF10: 0
PAINLEVEL_OUTOF10: 5
PAINLEVEL_OUTOF10: 0
PAINLEVEL_OUTOF10: 0

## 2017-07-31 ASSESSMENT — ENCOUNTER SYMPTOMS
MYALGIAS: 0
DIZZINESS: 0
FEVER: 0
COUGH: 0
HEADACHES: 0
HEARTBURN: 0

## 2017-07-31 ASSESSMENT — LIFESTYLE VARIABLES: DO YOU DRINK ALCOHOL: NO

## 2017-07-31 NOTE — PROGRESS NOTES
Patient brought up to unit by charge nurse, patient is a higher level care patient. Report taken form KAYY Miranda. Vital sign done on patient, patient given cardizem IV 20mg, pushed slowly over 2min, patient tolerated well, heart decrease from 144-74, Assessment done on patient, 2RN skin check done on patient, patient came up on bedpan and was taken off, SCD machine found and started on patient, patient denies any pain and needs at this time.

## 2017-07-31 NOTE — PROGRESS NOTES
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 This can be linked to the lung nodules seen in her latest chest x-ray. MD being made aware on rounds.

## 2017-07-31 NOTE — PROGRESS NOTES
Patient's heart rate continues to hold steady in the 140s.  Hospitalist notified.  Order received for patient transfer to telemetry.

## 2017-07-31 NOTE — CARE PLAN
Problem: Skin Integrity  Goal: Risk for impaired skin integrity will decrease  Outcome: PROGRESSING AS EXPECTED  Patient being turned and positioned Q2 hours due to immobility of LLE. Educated patient on importance of shifting weight to prevent ulcer formation. Patient expressed understanding teaching.     Problem: Respiratory:  Goal: Respiratory status will improve  Outcome: PROGRESSING AS EXPECTED  Pt has productive congested cough, on IV abx for pneumonia treatment. Not requiring O2 at this time. Educated patient on pneumonia prevention and treatment. Respirations even and not labored.

## 2017-07-31 NOTE — DISCHARGE PLANNING
Pt's dtr stating she called [facility] who informed her [facility] contracted [facility] and also have open beds. SW will follow up with referral in AM as well as bed availability.

## 2017-07-31 NOTE — PROGRESS NOTES
MS:   Afib/Aflutter, patient had a 2.2 sec pause at 0205 and reverted back to Sinus rhythm, HR    -/.08/-

## 2017-07-31 NOTE — DISCHARGE PLANNING
Medical Social Work    Followed up with pt to obtain choice. Pt would like referrals made to 1. [facility] 2. [facility], and [facility]. Choice form completed and faxed to [facility].

## 2017-07-31 NOTE — CONSULTS
Pulmonary Progress Note    Name:Deanna Ram  MRN:[id number]    Referring physician: MARTITA Petersen.    Chief Complaint: Nocturnal hypoxia     Interval history - dry mouth overnight, no new issues, has some baseline SOB; still on low flow O2. CT chest yesterday: Bilateral pulmonary nodules measuring up to 1 cm in the medial left lower lobe. Further evaluation can be obtained with PET/CT. Alternatively, further evaluation can be obtained with CT chest in 3 months.  Right basilar parenchymal scarring.  Underlying emphysematous changes.  Mediastinal lymphadenopathy is nonspecific and may be infectious/inflammatory or neoplastic.  Prominent atherosclerotic plaque.  Prominence of the common duct. Further evaluation can be obtained with right upper quadrant ultrasound.            History of Present Illness:  <HPHISTORY>  85 yo pleasant female who is admitted on 7/28/17 following a GLF. Found to have a left trimalleolar ankle fx and is s/p ORIF on 7/29.  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 She was not treated with antimicrobials. She was recently prescribed nocturnal oxygen at 2LPM for unclear reason and its unclear if any testing was done. She does not snore, no witnessed apneic events by her partner, she wakes up 3x every night for nocturia. No daytime somnolence or naps. She is a 25PY smoker, quit >35yrs ago. While inpatient here, was found to be hypoxic while sleeping on 2LPM, increased with slow improvement. In daytime, she has been on 2-3LPM and SPO2 is %. She denies any respiratory complaints. Uses 2 pillows at night as she has kyphosis.    Past Medical History:   has a past medical history of Hypertension; Type II or unspecified type diabetes mellitus without mention of complication, not stated as uncontrolled; Hypercholesteremia; Poor historian; Chronic atrial fibrillation (CMS-HCC); and Osteoporosis.    Past Surgical History:   has past surgical history  that includes mastectomy (left); lobectomy; tonsillectomy and adenoidectomy; mastoidectomy; appendectomy; cholecystectomy; and ankle orif (Left, 7/29/2017).    Allergies:  Morphine and related and Levaquin    Medications:  No current facility-administered medications on file prior to encounter.     Current Outpatient Prescriptions on File Prior to Encounter   Medication Sig Dispense Refill   • HYDROmorphone (DILAUDID) 2 MG Tab Take 0.5-2 Tabs by mouth every 6 hours as needed for Severe Pain. 7 Tab 0   • sulfamethoxazole-trimethoprim (BACTRIM DS) 800-160 MG tablet Take 1 Tab by mouth 2 times a day for 5 days. 10 Tab 0   • alendronate (FOSAMAX) 70 MG Tab Take 70 mg by mouth every Monday.     • metformin (GLUCOPHAGE) 1000 MG tablet Take 1,000 mg by mouth every morning.     • glipiZIDE (GLUCOTROL) 5 MG Tab Take 5 mg by mouth every morning.     • pravastatin (PRAVACHOL) 40 MG tablet Take 40 mg by mouth every evening.         Social History:   reports that she has quit smoking. She has never used smokeless tobacco. She reports that she drinks alcohol. She reports that she does not use illicit drugs.    Family History:  family history is not on file.    ROS:  As per HPI. The rest of systems are negative per AMA and CMMS guidelines.    Physical Examination:  Physical Exam   Constitutional: @[facility]@ is oriented to person, place, and time. @[facility]@ appears well-developed and well-nourished.   HENT:   Head: Normocephalic and atraumatic.   Eyes: Conjunctivae are normal. Pupils are equal, round, and reactive to light.   Neck: Normal range of motion. Neck supple. No tracheal deviation present. No thyromegaly present.   Cardiovascular: Normal rate and regular rhythm.    Pulmonary/Chest: Effort normal and breath sounds normal. No respiratory distress. @CAP@ has no wheezes.   Abdominal: Soft. Bowel sounds are normal. @CAP@ exhibits no distension.   Musculoskeletal: Normal range of motion. @CAP@ exhibits no edema.   Neurological:  [unfilled] is alert and oriented to person, place, and time. No cranial nerve deficit.   Skin: Skin is warm and dry. [unfilled] is not diaphoretic.       Laboratories:  Recent Labs      07/28/17   1900 07/29/17   0308  07/31/17   0325   WBC  10.3  9.2  11.5*   RBC  4.17*  3.86*  3.31*   HEMOGLOBIN  12.1  10.7*  9.4*   HEMATOCRIT  35.0*  33.7*  29.8*   MCV  83.9  87.3  90.0   MCH  29.0  27.7  28.4   RDW  45.7  49.1  51.1*   PLATELETCT  371  353  300   MPV  8.8*  9.1  9.1   NEUTSPOLYS  74.30*  72.80*   --    LYMPHOCYTES  12.60*  13.40*   --    MONOCYTES  10.90  11.30   --    EOSINOPHILS  1.20  1.50   --    BASOPHILS  0.30  0.50   --      Recent Labs      07/28/17 1948 07/29/17 0308 07/31/17   0325   SODIUM  121*  121*  129*   POTASSIUM  3.8  3.9  4.7   CHLORIDE  89*  89*  96   CO2  22  26  28   GLUCOSE  151*  147*  170*   BUN  14  13  19       CXR images are reviewed by me. There is an elevated R hemidiaphragm. Additionally, a RML rounded opacity is present.    Impression:  Active Hospital Problems    Diagnosis   • Hyponatremia [E87.1]   • Abnormal CXR [R93.8]   • Acute on chronic respiratory failure with hypoxemia (CMS-HCC) [J96.21]   • Constipated [K59.00]   • Trimalleolar fracture [S82.853A]   • Chronic atrial fibrillation (CMS-HCC) [I48.2]   • UTI (urinary tract infection) [N39.0]   • Type II diabetes mellitus (CMS-HCC) [E11.9]   • Hypertension [I10]   • Hyperlipidemia [E78.5]   • Osteoporosis [M81.0]     Nocturnal hypoxia: presumably from atelectasis as post-op. Hx of RUL lobectomy and currently with radiographic evidence of R hemidiaphragm elevation.   Continue IS, OOB to chair, mobilize as tolerated  Will plan for nocturnal oximetry study  Not candidate for NIPPV at this time as low risk for BRITNEY- can proceed with outpt PSG if needed      Hyponatremia: Chronic in nature according to patient  Etiology unclear, not on thiazides    Hypoxia likely 2/2 age, atx, lobectomy, prior scarring. PFTs as outpatient with  follow up with f/u PET/CT or CT chest. Will also do CNOX as outpatient. Should check thyroid and cortisol level for hypernatremia, osmols.    Dr. Chan Kenney

## 2017-07-31 NOTE — ASSESSMENT & PLAN NOTE
Due to atelectasis, no inpatient CPAP needed per pulmonary  Resolved  Will continue on O2 at the rehab facility

## 2017-07-31 NOTE — CARE PLAN
Problem: Pain Management  Goal: Pain level will decrease to patient’s comfort goal  Outcome: PROGRESSING AS EXPECTED  Pt states being comfortable, not requiring pain medication at this time. Educated patient on available pain medications.     Problem: Skin Integrity  Goal: Risk for impaired skin integrity will decrease  Outcome: PROGRESSING AS EXPECTED  Pt educated on Q2 hr turns and importance of keeping weight off bony prominences. Pt understands education.

## 2017-07-31 NOTE — DISCHARGE PLANNING
Medical Social Work    [facility] denied pt due to not being contracted with secondary insurance. SW met with pt and family and informed [facility] is [facility] with only two agencies in [facility]. Pt and family agreeable to sending referral to [place]. Family also requested SW follow up with referral to [facility] in the AM, as family may be willing to pay out of pocket for co pay.     Choice form completed and faxed to [facility].

## 2017-07-31 NOTE — DISCHARGE PLANNING
Care Transition Team Assessment    IHD met with pt and family at bedside. Pt states that she currently lives at home with SO, but believes that she will be discharging to a SNF. She states that she previously has used a walker and home O2 provided by [employer].     Information Source  Orientation : Oriented x 4  Information Given By: Patient  Informant's Name: Deanna  Who is responsible for making decisions for patient? : Patient         Elopement Risk  Legal Hold: No  Ambulatory or Self Mobile in Wheelchair: No-Not an Elopement Risk  Elopement Risk: Not at Risk for Elopement    Interdisciplinary Discharge Planning  Does Admitting Nurse Feel This Could be a Complex Discharge?: No  Primary Care Physician: Dr. Rani Hendricks  Lives with - Patient's Self Care Capacity: Spouse  Patient or legal guardian wants to designate a caregiver (see row info): No  Support Systems: Family Member(s)  Housing / Facility: [address]  Do You Take your Prescribed Medications Regularly: Yes  Able to Return to Previous ADL's: Future Time w/Therapy  Mobility Issues: Yes  Prior Services: Skilled Home Health Services (order had not yet started. for general weakness and back pn.)  Patient Expects to be Discharged to:: SNF  Assistance Needed: No  Durable Medical Equipment: Walker, Home Oxygen    Discharge Preparedness  What is your plan after discharge?: Skilled nursing facility  What are your discharge supports?: Spouse, Child  Prior Functional Level: Independent with Activities of Daily Living, Independent with Medication Management, Uses Walker, Ambulatory  Difficulity with ADLs: Walking  Difficulty with ADLs Comment: uses walker  Difficulity with IADLs: Driving  Difficulity with IADL Comments: Spouse assists    Functional Assesment  Prior Functional Level: Independent with Activities of Daily Living, Independent with Medication Management, Uses Walker, Ambulatory    Finances  Financial Barriers to Discharge: No  Prescription Coverage: Yes  ([employer] in [place])    Vision / Hearing Impairment  Vision Impairment : No  Hearing Impairment : No    Values / Beliefs / Concerns  Values / Beliefs Concerns : No         Domestic Abuse  Have you ever been the victim of abuse or violence?: No  Physical Abuse or Sexual Abuse: No  Verbal Abuse or Emotional Abuse: No  Possible Abuse Reported to:: Not Applicable    Psychological Assessment  History of Substance Abuse: None  History of Psychiatric Problems: No  Non-compliant with Treatment: No    Discharge Risks or Barriers  Discharge risks or barriers?: No    Anticipated Discharge Information  Anticipated discharge disposition: [facility]  Discharge Address: [address] [place] [address]  Discharge Contact Phone Number: [phone]

## 2017-07-31 NOTE — PROGRESS NOTES
Patient heart rate found to be steady in the 140s.  Hospitalist notified.  Order received for 10 mg Labetalol IV.

## 2017-07-31 NOTE — DISCHARGE PLANNING
Received choice form from MAYRA Pak for SNF. Referral sent to [facility] at 1058.

## 2017-07-31 NOTE — PROGRESS NOTES
2RN skin check:   Patient skin is intact, some redness to bilateral ears but blanching, also has some redness to sacrum but its also blanching, patient has dressing from foot to knee on left leg from surgery. Cast padding and elastic bandage to leg, clean, dry and intact

## 2017-07-31 NOTE — PROGRESS NOTES
[facility]    Date of Service: 2017    Chief Complaint  86 y.o. female admitted 2017 with trimalleolar fx/uti/hyponatremia    Interval Problem Update  Feels ok with some surgical pain; denies sob/cp; eating ok.  Inquire about SNF transfer.    Consultants/Specialty  Ortho/pulm    Disposition  snf        Review of Systems   Constitutional: Negative for fever.   Respiratory: Negative for cough.    Cardiovascular: Negative for chest pain.   Gastrointestinal: Negative for heartburn.   Genitourinary: Negative for dysuria.   Musculoskeletal: Negative for myalgias.   Skin: Negative for rash.   Neurological: Negative for dizziness and headaches.      Physical Exam  Laboratory/Imaging   Hemodynamics  Temp (24hrs), Av.3 °C (97.4 °F), Min:36.1 °C (97 °F), Max:36.6 °C (97.8 °F)   Temperature: 36.4 °C (97.6 °F)  Pulse  Av.7  Min: 64  Max: 144    Blood Pressure : (!) 162/69 mmHg (Rn notified)      Respiratory      Respiration: 19, Pulse Oximetry: 94 %             Fluids    Intake/Output Summary (Last 24 hours) at 17 1626  Last data filed at 17 1200   Gross per 24 hour   Intake   1770 ml   Output    350 ml   Net   1420 ml       Nutrition  Orders Placed This Encounter   Procedures   • DIET ORDER     Standing Status: Standing      Number of Occurrences: 1      Standing Expiration Date:      Order Specific Question:  Diet:     Answer:  Regular [1]     Order Specific Question:  Consistency/Fluid modifications:     Answer:  1000 ml Fluid Restriction [7]     Physical Exam   Constitutional: No distress.   HENT:   Head: Normocephalic.   Eyes: EOM are normal.   Neck: Neck supple.   Cardiovascular: Normal rate and regular rhythm.    Pulmonary/Chest: Effort normal and breath sounds normal.   Abdominal: Soft. Bowel sounds are normal. She exhibits no distension. There is no tenderness.   Musculoskeletal: She exhibits no edema.   LLE in distal cast   Neurological: She is alert.   Skin: Skin is  warm.   Psychiatric: Her behavior is normal.       Recent Labs      07/28/17   1900  07/29/17   0308  07/31/17   0325   WBC  10.3  9.2  11.5*   RBC  4.17*  3.86*  3.31*   HEMOGLOBIN  12.1  10.7*  9.4*   HEMATOCRIT  35.0*  33.7*  29.8*   MCV  83.9  87.3  90.0   MCH  29.0  27.7  28.4   MCHC  34.6  31.8*  31.5*   RDW  45.7  49.1  51.1*   PLATELETCT  371  353  300   MPV  8.8*  9.1  9.1     Recent Labs      07/28/17   1948  07/29/17   0308  07/31/17   0325   SODIUM  121*  121*  129*   POTASSIUM  3.8  3.9  4.7   CHLORIDE  89*  89*  96   CO2  22  26  28   GLUCOSE  151*  147*  170*   BUN  14  13  19   CREATININE  0.97  0.86  0.90   CALCIUM  8.6  8.4*  8.4*     Recent Labs      07/28/17 1900   APTT  34.9   INR  1.00     Recent Labs      07/28/17 1900   BNPBTYPENAT  195*              Assessment/Plan     Trimalleolar fracture (present on admission)  Assessment & Plan  Status post OR 7/29. Stable; can go to SNF soon    Chronic atrial fibrillation (CMS-[facility]) (present on admission)  Assessment & Plan  Rate controlled; cont current rx    UTI (urinary tract infection) (present on admission)  Assessment & Plan  Patient given Rocephin in the emergency department, on 3 days total therapy. Her symptoms are improving    Type II diabetes mellitus (CMS-[facility]) (present on admission)  Assessment & Plan  Cont SSI/glucose checks    Hypertension (present on admission)  Assessment & Plan  Adjust med for good control    Hyperlipidemia (present on admission)  Assessment & Plan  Continue pravastatin.    Osteoporosis (present on admission)  Assessment & Plan  On Fosamax as an outpatient, hold this while healing from acute fractures.    Hyponatremia (present on admission)  Assessment & Plan  Given the amount of fluids she's had this is not due to volume depletion and is concerning for SIADH fluid restriction and salt tabs have been initiated.  Sodium level improved; cont to monitor    Abnormal CXR (present on admission)  Assessment & Plan  CT shows  a 1 cm nodule in the left lower lobe as well as a large anterior mediastinal node  This is concerning in light of her smoking history and hyponatremia.  Discussed results with patient and family.  Will need repeat CT in 3-6 months.      Acute on chronic respiratory failure with hypoxemia (CMS-HCC)  Assessment & Plan  Due to atelectasis, no inpatient CPAP needed per pulmonary; stable today    Constipated (present on admission)  Assessment & Plan  Change MiraLAX to daily    Core Measures

## 2017-07-31 NOTE — ASSESSMENT & PLAN NOTE
Given the amount of fluids she's had this is not due to volume depletion and is concerning for SIADH fluid restriction and salt tabs have been initiated

## 2017-07-31 NOTE — PROGRESS NOTES
Bedside report completed with Elida porter RN, Assumed patient care. Patient resting comfortably in bed, AOx4, denies pain. Fall precautions in place with bed in lowest position, nonskid footwear intact, call light in reach. All patient needs met at this time.

## 2017-07-31 NOTE — RESPIRATORY CARE
COPD EDUCATION by COPD CLINICAL EDUCATOR  7/31/2017 at 1:02 PM by Gay Chow     Patient reviewed by COPD education team. Patient does not qualify for COPD program.

## 2017-07-31 NOTE — PROGRESS NOTES
[facility] Progress Note    Date of Service: 7/30/2017    Chief Complaint  86 y.o. Female with past medical history type II diabetes (on Glucotrol, Actos and metformin), dyslipidemia (on Pravachol), spastic bladder(on Ditropan), osteoporosis(on Fosamax), hypertension (on metoprolol), recent UTI (being treated with Bactrim )- recently also had TIA workup about a week ago which was negative. Admitted 7/28/2017 with trimalleolar fracture, taken to the OR by Dr. Ashwin Maier 7/29/2017.    Interval Problem Update  She initially did not recall seeing me postoperatively yesterday, but then remembered our conversation. Last night she was hypoxic even on supplemental oxygen. She presented with a low sodium of 121, even after hydration it is still 121. Patient relates that this has been going on for several months. Additionally she was drinking lots of fluids for her urinary tract infection recently. Patient discussed with Dr. Garcia. Chest x-ray images reviewed. The lesion noted on chest x-ray does not appear like a vessel to either of us and CT scan was subsequently obtained showing a 1 cm mass in the medial left lower lobe as well as an anterior mediastinal node up to 2 cm there is also a 6 mm nodule in the right upper lobe. Given her smoking history sure require additional workup especially in the light of her hyponatremia.    Consultants/Specialty  Orthopedic- Darrion  Pulmonary- Jose    Disposition  TBD, likely skilled  Will need outpatient PET scan, and probable biopsy        Review of Systems   Respiratory: Negative for cough, shortness of breath and wheezing.    Cardiovascular: Negative for chest pain.   Gastrointestinal: Positive for constipation. Negative for nausea, vomiting, abdominal pain and diarrhea.   Genitourinary: Positive for dysuria (improved).   Neurological: Negative for dizziness, loss of consciousness, weakness and headaches.   Psychiatric/Behavioral: The patient is not nervous/anxious.        Physical Exam  Laboratory/Imaging   Hemodynamics  Temp (24hrs), Av.6 °C (97.8 °F), Min:35.9 °C (96.7 °F), Max:37.5 °C (99.5 °F)   Temperature: 36.6 °C (97.9 °F)  Pulse  Av.2  Min: 64  Max: 93    Blood Pressure : 156/57 mmHg      Respiratory      Respiration: 16, Pulse Oximetry: 91 %, O2 Daily Delivery Respiratory : Silicone Nasal Cannula             Fluids    Intake/Output Summary (Last 24 hours) at 17 1819  Last data filed at 17 1300   Gross per 24 hour   Intake   1093 ml   Output    200 ml   Net    893 ml       Nutrition  Orders Placed This Encounter   Procedures   • DIET ORDER     Standing Status: Standing      Number of Occurrences: 1      Standing Expiration Date:      Order Specific Question:  Diet:     Answer:  Regular [1]     Order Specific Question:  Consistency/Fluid modifications:     Answer:  1000 ml Fluid Restriction [7]     Physical Exam   Constitutional: She is oriented to person, place, and time. She appears well-developed and well-nourished. No distress.   HENT:   Head: Normocephalic and atraumatic.   Mouth/Throat: Oropharynx is clear and moist.   Eyes: EOM are normal. Pupils are equal, round, and reactive to light. Right eye exhibits no discharge. Left eye exhibits no discharge.   Neck: Neck supple.   Cardiovascular: Normal rate.  An irregularly irregular rhythm present.   Pulmonary/Chest: Effort normal and breath sounds normal.   Abdominal: Soft. She exhibits distension (about the same). There is no tenderness.   decreased bowel sounds   Musculoskeletal: She exhibits tenderness (R). She exhibits no edema (R).   LLE splinted and heavily dressed   Neurological: She is alert and oriented to person, place, and time. No cranial nerve deficit.   Skin: Skin is warm and dry. She is not diaphoretic.   Psychiatric: She has a normal mood and affect.   Nursing note and vitals reviewed.      Recent Labs      17   1900  17   0308   WBC  10.3  9.2   RBC  4.17*  3.86*    HEMOGLOBIN  12.1  10.7*   HEMATOCRIT  35.0*  33.7*   MCV  83.9  87.3   MCH  29.0  27.7   MCHC  34.6  31.8*   RDW  45.7  49.1   PLATELETCT  371  353   MPV  8.8*  9.1     Recent Labs      07/28/17   1948  07/29/17   0308   SODIUM  121*  121*   POTASSIUM  3.8  3.9   CHLORIDE  89*  89*   CO2  22  26   GLUCOSE  151*  147*   BUN  14  13   CREATININE  0.97  0.86   CALCIUM  8.6  8.4*     Recent Labs      07/28/17   1900   APTT  34.9   INR  1.00     Recent Labs      07/28/17   1900   BNPBTYPENAT  195*              Assessment/Plan     Trimalleolar fracture (present on admission)  Assessment & Plan  Status post OR 7/29. Likely she will require skilled rehab    Chronic atrial fibrillation (CMS-HCC) (present on admission)  Assessment & Plan  No longer tachycardic    UTI (urinary tract infection) (present on admission)  Assessment & Plan  Patient given Rocephin in the emergency department, on 3 days total therapy. Her symptoms are improving    Type II diabetes mellitus (CMS-HCC) (present on admission)  Assessment & Plan  Sugars labile no pattern as yet to add long-acting insulin    Hypertension (present on admission)  Assessment & Plan  Continue metoprolol    Hyperlipidemia (present on admission)  Assessment & Plan  Continue pravastatin.    Osteoporosis (present on admission)  Assessment & Plan  On Fosamax as an outpatient, hold this while healing from acute fractures.    Hyponatremia (present on admission)  Assessment & Plan  Given the amount of fluids she's had this is not due to volume depletion and is concerning for SIADH fluid restriction and salt tabs have been initiated    Abnormal CXR (present on admission)  Assessment & Plan  CT shows a 1 cm nodule in the left lower lobe as well as a large anterior mediastinal node  This is concerning in light of her smoking history and hyponatremia  Outpatient PET scan recommended    Acute on chronic respiratory failure with hypoxemia (CMS-HCC)  Assessment & Plan  Due to atelectasis,  no inpatient CPAP needed per pulmonary    Constipated (present on admission)  Assessment & Plan  Change MiraLAX to daily      Medications reviewed and Labs reviewed  Crowder catheter: No Crowder      DVT: start medical prophylaxis if hemoglobin stable tomorrow.  DVT prophylaxis - mechanical: SCDs  Ulcer prophylaxis: Not indicated    Assessed for rehab: Patient was assess for and/or received rehabilitation services during this hospitalization

## 2017-07-31 NOTE — PROGRESS NOTES
Patient escorted to telemetry with RN.  All belongings with patient.  Report called to Marta SULTANA.

## 2017-07-31 NOTE — CARE PLAN
Problem: Safety  Goal: Will remain free from injury  Outcome: PROGRESSING AS EXPECTED  Bed alarm in place, patient is alert and oriented, call bell within reach     Problem: Skin Integrity  Goal: Risk for impaired skin integrity will decrease  Outcome: PROGRESSING AS EXPECTED  Barrier cream in use, patient will be Q2 turned, can check frequently to maintain dryness

## 2017-07-31 NOTE — DISCHARGE PLANNING
Medical Social Work    Met with pt and spouse at bedside to discuss SNF placement. Pt agreeable to SNF and spouse requested ratings for SNF facilities in [facility]. SW provided Medicare.gov ratings and will follow up for choice.

## 2017-07-31 NOTE — PROGRESS NOTES
"   Orthopaedic Progress Note    Interval changes:  Patient doing well post op  Derium last PM - possible sundowning -clear this am    ROS - Patient denies any new issues.  Pain well controlled.    Blood pressure 167/61, pulse 89, temperature 36.5 °C (97.7 °F), resp. rate 19, height 1.549 m (5' 1\"), weight 58.514 kg (129 lb), SpO2 94 %, not currently breastfeeding.      Patient seen and examined  No acute distress  Breathing non labored  RRR  LLE in short leg splint, DNVI, moves all toes, cap refill < 2 sec.      Recent Labs      07/28/17   1900  07/29/17   0308  07/31/17   0325   WBC  10.3  9.2  11.5*   RBC  4.17*  3.86*  3.31*   HEMOGLOBIN  12.1  10.7*  9.4*   HEMATOCRIT  35.0*  33.7*  29.8*   MCV  83.9  87.3  90.0   MCH  29.0  27.7  28.4   MCHC  34.6  31.8*  31.5*   RDW  45.7  49.1  51.1*   PLATELETCT  371  353  300   MPV  8.8*  9.1  9.1       Active Hospital Problems    Diagnosis   • Hyponatremia [E87.1]   • Abnormal CXR [R93.8]   • Acute on chronic respiratory failure with hypoxemia (CMS-HCC) [J96.21]   • Constipated [K59.00]   • Trimalleolar fracture [S82.853A]   • Chronic atrial fibrillation (CMS-HCC) [I48.2]   • UTI (urinary tract infection) [N39.0]   • Type II diabetes mellitus (CMS-HCC) [E11.9]   • Hypertension [I10]   • Hyperlipidemia [E78.5]   • Osteoporosis [M81.0]       Assessment/Plan:  Cleared by ortho for DC to SNF pending medicine clearance  POD#2 S/P:  1. Open treatment with internal fixation of left trimalleolar ankle fracture with fixation    of posterior lip  2. Physician applied stress examination of left ankle under fluoroscopy  Wt bearing status - NWB LLE   Wound care/Drains - splint/ dressing to be left in place until follow up in two weeks  Future Procedures - none planned  Sutures/Staples out- 10-14 days post operatively  PT/OT-initiated  Antibiotics: completed  DVT Prophylaxis- TEDS/SCDs/Foot pumps/aspirin  Crowder-none  Case Coordination for Discharge Planning - Disposition SNF     "

## 2017-07-31 NOTE — PROGRESS NOTES
"Patient states Oxycodone makes her confused and gives her \"weird dreams.\"  Hospitalist notified.  Order received for Tramadol 50 mg PO Q6 PRN.  "

## 2017-07-31 NOTE — DISCHARGE PLANNING
Received declined notification from [facility], NO open beds. Notified MAYRA Pak via phone.

## 2017-07-31 NOTE — DISCHARGE PLANNING
Received call to expand the referrals for SNF from [facility]. Referral sent to [facility] at [phone].

## 2017-07-31 NOTE — ASSESSMENT & PLAN NOTE
CT shows a 1 cm nodule in the left lower lobe as well as a large anterior mediastinal node  This is concerning in light of her smoking history and hyponatremia  Outpatient PET scan recommended, defer to her primary provider  Follow up with JONNY Spann Or attending physician at [facility] upon receipt.

## 2017-08-01 VITALS
HEIGHT: 61 IN | BODY MASS INDEX: 24.81 KG/M2 | HEART RATE: 71 BPM | SYSTOLIC BLOOD PRESSURE: 142 MMHG | DIASTOLIC BLOOD PRESSURE: 61 MMHG | RESPIRATION RATE: 19 BRPM | OXYGEN SATURATION: 99 % | WEIGHT: 131.39 LBS | TEMPERATURE: 96.3 F

## 2017-08-01 LAB
ANION GAP SERPL CALC-SCNC: 3 MMOL/L (ref 0–11.9)
BASOPHILS # BLD AUTO: 0.7 % (ref 0–1.8)
BASOPHILS # BLD: 0.06 K/UL (ref 0–0.12)
BUN SERPL-MCNC: 23 MG/DL (ref 8–22)
CALCIUM SERPL-MCNC: 8.8 MG/DL (ref 8.5–10.5)
CHLORIDE SERPL-SCNC: 97 MMOL/L (ref 96–112)
CO2 SERPL-SCNC: 31 MMOL/L (ref 20–33)
CREAT SERPL-MCNC: 0.78 MG/DL (ref 0.5–1.4)
EOSINOPHIL # BLD AUTO: 0.36 K/UL (ref 0–0.51)
EOSINOPHIL NFR BLD: 3.9 % (ref 0–6.9)
ERYTHROCYTE [DISTWIDTH] IN BLOOD BY AUTOMATED COUNT: 50.4 FL (ref 35.9–50)
GFR SERPL CREATININE-BSD FRML MDRD: >60 ML/MIN/1.73 M 2
GLUCOSE BLD-MCNC: 154 MG/DL (ref 65–99)
GLUCOSE BLD-MCNC: 164 MG/DL (ref 65–99)
GLUCOSE BLD-MCNC: 235 MG/DL (ref 65–99)
GLUCOSE SERPL-MCNC: 124 MG/DL (ref 65–99)
HCT VFR BLD AUTO: 30 % (ref 37–47)
HGB BLD-MCNC: 9.4 G/DL (ref 12–16)
IMM GRANULOCYTES # BLD AUTO: 0.04 K/UL (ref 0–0.11)
IMM GRANULOCYTES NFR BLD AUTO: 0.4 % (ref 0–0.9)
LYMPHOCYTES # BLD AUTO: 1.13 K/UL (ref 1–4.8)
LYMPHOCYTES NFR BLD: 12.4 % (ref 22–41)
MCH RBC QN AUTO: 28.1 PG (ref 27–33)
MCHC RBC AUTO-ENTMCNC: 31.3 G/DL (ref 33.6–35)
MCV RBC AUTO: 89.6 FL (ref 81.4–97.8)
MONOCYTES # BLD AUTO: 0.89 K/UL (ref 0–0.85)
MONOCYTES NFR BLD AUTO: 9.8 % (ref 0–13.4)
NEUTROPHILS # BLD AUTO: 6.64 K/UL (ref 2–7.15)
NEUTROPHILS NFR BLD: 72.8 % (ref 44–72)
NRBC # BLD AUTO: 0 K/UL
NRBC BLD AUTO-RTO: 0 /100 WBC
PLATELET # BLD AUTO: 274 K/UL (ref 164–446)
PMV BLD AUTO: 8.9 FL (ref 9–12.9)
POTASSIUM SERPL-SCNC: 5.3 MMOL/L (ref 3.6–5.5)
RBC # BLD AUTO: 3.35 M/UL (ref 4.2–5.4)
SODIUM SERPL-SCNC: 131 MMOL/L (ref 135–145)
WBC # BLD AUTO: 9.1 K/UL (ref 4.8–10.8)

## 2017-08-01 PROCEDURE — A9270 NON-COVERED ITEM OR SERVICE: HCPCS | Performed by: INTERNAL MEDICINE

## 2017-08-01 PROCEDURE — G8988 SELF CARE GOAL STATUS: HCPCS | Mod: CJ

## 2017-08-01 PROCEDURE — 700102 HCHG RX REV CODE 250 W/ 637 OVERRIDE(OP): Performed by: NURSE PRACTITIONER

## 2017-08-01 PROCEDURE — 82962 GLUCOSE BLOOD TEST: CPT

## 2017-08-01 PROCEDURE — 36415 COLL VENOUS BLD VENIPUNCTURE: CPT

## 2017-08-01 PROCEDURE — 99239 HOSP IP/OBS DSCHRG MGMT >30: CPT | Performed by: INTERNAL MEDICINE

## 2017-08-01 PROCEDURE — A9270 NON-COVERED ITEM OR SERVICE: HCPCS | Performed by: NURSE PRACTITIONER

## 2017-08-01 PROCEDURE — G8987 SELF CARE CURRENT STATUS: HCPCS | Mod: CK

## 2017-08-01 PROCEDURE — 80048 BASIC METABOLIC PNL TOTAL CA: CPT

## 2017-08-01 PROCEDURE — 700102 HCHG RX REV CODE 250 W/ 637 OVERRIDE(OP): Performed by: INTERNAL MEDICINE

## 2017-08-01 PROCEDURE — 700102 HCHG RX REV CODE 250 W/ 637 OVERRIDE(OP): Performed by: HOSPITALIST

## 2017-08-01 PROCEDURE — 85025 COMPLETE CBC W/AUTO DIFF WBC: CPT

## 2017-08-01 PROCEDURE — A9270 NON-COVERED ITEM OR SERVICE: HCPCS | Performed by: HOSPITALIST

## 2017-08-01 PROCEDURE — 97165 OT EVAL LOW COMPLEX 30 MIN: CPT

## 2017-08-01 RX ORDER — AMOXICILLIN 250 MG
2 CAPSULE ORAL 2 TIMES DAILY
Qty: 30 TAB | Refills: 0
Start: 2017-08-01

## 2017-08-01 RX ORDER — SODIUM CHLORIDE 1 G/1
1 TABLET ORAL
Qty: 90 TAB | Refills: 3
Start: 2017-08-01

## 2017-08-01 RX ORDER — HYDROMORPHONE HYDROCHLORIDE 2 MG/1
1-4 TABLET ORAL EVERY 6 HOURS PRN
Qty: 12 TAB | Refills: 0
Start: 2017-08-01

## 2017-08-01 RX ORDER — BISACODYL 10 MG
10 SUPPOSITORY, RECTAL RECTAL
Refills: 0
Start: 2017-08-01

## 2017-08-01 RX ORDER — POLYETHYLENE GLYCOL 3350 17 G/17G
17 POWDER, FOR SOLUTION ORAL DAILY
Refills: 3
Start: 2017-08-01

## 2017-08-01 RX ADMIN — STANDARDIZED SENNA CONCENTRATE AND DOCUSATE SODIUM 2 TABLET: 8.6; 5 TABLET, FILM COATED ORAL at 08:14

## 2017-08-01 RX ADMIN — POLYETHYLENE GLYCOL 3350 1 PACKET: 17 POWDER, FOR SOLUTION ORAL at 08:14

## 2017-08-01 RX ADMIN — SODIUM CHLORIDE TAB 1 GM 1 G: 1 TAB at 11:58

## 2017-08-01 RX ADMIN — TRAMADOL HYDROCHLORIDE 50 MG: 50 TABLET, COATED ORAL at 04:28

## 2017-08-01 RX ADMIN — INSULIN LISPRO 2 UNITS: 100 INJECTION, SOLUTION INTRAVENOUS; SUBCUTANEOUS at 11:50

## 2017-08-01 RX ADMIN — METOPROLOL SUCCINATE 50 MG: 50 TABLET, EXTENDED RELEASE ORAL at 08:14

## 2017-08-01 RX ADMIN — SODIUM CHLORIDE TAB 1 GM 1 G: 1 TAB at 08:10

## 2017-08-01 RX ADMIN — ASPIRIN 81 MG: 81 TABLET, CHEWABLE ORAL at 08:14

## 2017-08-01 RX ADMIN — OXYCODONE HYDROCHLORIDE 5 MG: 5 TABLET ORAL at 09:10

## 2017-08-01 RX ADMIN — OXYBUTYNIN CHLORIDE 2.5 MG: 5 TABLET ORAL at 08:10

## 2017-08-01 ASSESSMENT — COGNITIVE AND FUNCTIONAL STATUS - GENERAL
SUGGESTED CMS G CODE MODIFIER DAILY ACTIVITY: CK
TOILETING: A LOT
DRESSING REGULAR UPPER BODY CLOTHING: A LITTLE
DRESSING REGULAR LOWER BODY CLOTHING: A LOT
HELP NEEDED FOR BATHING: A LOT
DAILY ACTIVITIY SCORE: 16
PERSONAL GROOMING: A LITTLE

## 2017-08-01 ASSESSMENT — PAIN SCALES - GENERAL
PAINLEVEL_OUTOF10: 4
PAINLEVEL_OUTOF10: 1
PAINLEVEL_OUTOF10: 0
PAINLEVEL_OUTOF10: 0

## 2017-08-01 ASSESSMENT — ACTIVITIES OF DAILY LIVING (ADL): TOILETING: INDEPENDENT

## 2017-08-01 ASSESSMENT — LIFESTYLE VARIABLES
EVER_SMOKED: NEVER
EVER_SMOKED: NEVER

## 2017-08-01 NOTE — PROGRESS NOTES
Bedside report completed with Elida porter RN, Assumed patient care. Patient sitting up comfortably in bed, AOx4, denies pain. LLE cast clean, dry, intact. Toes are palpably warm, pt able to wiggle all toes. Discussed discharging to SNF in [place]. Fall precautions in place with bed in lowest position, treaded footwear intact, call light in reach. All patient needs met at this time.

## 2017-08-01 NOTE — DISCHARGE PLANNING
Received transport form from MAYRA Pak. Contacted [facility] spoke to Timmy they accepting today. Contacted [facility] transport spoke to Mich, who will check with supervisor and call back.

## 2017-08-01 NOTE — PROGRESS NOTES
Received bedside report from Angelique SULTANA .  Pt is a/o x 4.  POC discussed w/ pt, verbalizes understanding,  Bed is locked, low, belongings in reach, call light in reach.    Resting in bed, cast to left ankle, toes pink, blanching, wiggles well, SCD on right LE, voids per BP, plan Milk of Mag tonight and pain med.

## 2017-08-01 NOTE — DISCHARGE PLANNING
Received call back from Mich with [facility] transport, they will transport patient to [facility] at 1400. Notified MAYRA Pak via phone.

## 2017-08-01 NOTE — CARE PLAN
Problem: Bowel/Gastric:  Goal: Normal bowel function is maintained or improved  Outcome: PROGRESSING SLOWER THAN EXPECTED  Patient constipated, given Senna and Miralax. Had hard and very small BM this AM. Will continue to follow the bowel regimen in the MAR.     Problem: Urinary Elimination:  Goal: Ability to reestablish a normal urinary elimination pattern will improve  Outcome: PROGRESSING AS EXPECTED  Patient at times is incontinent of urine. Taking oxybutynin, able to articulate the need for a bedpan.

## 2017-08-01 NOTE — DISCHARGE SUMMARY
"HOSPITAL MEDICINE DISCHARGE SUMMARY    Name: Deanna Ram  MRN: [id number]  : 1931  Admit Date: 2017  Discharge Date: 2017  Attending Provider: Papa Mcdermott M.D.  Primary Care Physician: JONNY Spann    CHIEF COMPLAINT  Chief Complaint   Patient presents with   • T-5000 Ankle Injury     Left    • Other     [facility] \"[facility] left ankle, metatarsal fracture left, UTI, asymptomatic hyponatremia\".         CODE STATUS  Full Code    DISCHARGE DIAGNOSES WITH THEIR RESPECTIVE HOSPITAL COURSE, PLAN AND FOLLOW-UP  Please review Dr. Ashwin Maier M.D. notes for further details of history of present illness, past medical/social/family histories, allergies and medications. Please review Dr. Maier, Orthopedics consultation notes.    Trimalleolar fracture (present on admission)  Assessment & Plan  Status post open treatment with internal fixation of left trimalleolar ankle fracture with fixation    of posterior lip .   D/c to [facility]  Follow up with JONNY Spann Or attending physician at [facility] upon receipt.  Follow up to Dr. Maier, Orthopedics in 1 week for postop visit    Chronic atrial fibrillation (CMS-[facility]) (present on admission)  Assessment & Plan  No longer tachycardic  Continue metoprolol.  I spoke with Deanna Ram and her family myself. She has been offerred [facility] by [facility] for [facility] prevention. They had weighed risks and benefits and prefers not to be on anticoagulation. She has opted to be on baby aspirin instead.  Follow up with JONNY Spann Or attending physician at [facility] upon receipt.    UTI (urinary tract infection) (present on admission)  Assessment & Plan  Patient given Rocephin in the emergency department, on 3 days total therapy. Her symptoms are improving  Completed 5d course of Rocephin    Type II diabetes mellitus (CMS-HCC) (present on " admission)  Assessment & Plan  Sugars labile no pattern as yet to add long-acting insulin  Resume her oral regimen and SSI at rehab facility  Follow up with JONNY Spann Or attending physician at [facility] upon receipt.    Hypertension (present on admission)  Assessment & Plan  Continue metoprolol    Hyperlipidemia (present on admission)  Assessment & Plan  Continue pravastatin.    Osteoporosis (present on admission)  Assessment & Plan  On Fosamax as an outpatient, hold this while healing from acute fractures.    Hyponatremia (present on admission)  Assessment & Plan  Given the amount of fluids she's had this is not due to volume depletion and is concerning for SIADH fluid restriction and salt tabs have been initiated  Follow up with JONNY Spann Or attending physician at [facility] upon receipt. Rechecking BMP recommended    Abnormal CXR (present on admission)  Assessment & Plan  CT shows a 1 cm nodule in the left lower lobe as well as a large anterior mediastinal node  This is concerning in light of her smoking history and hyponatremia  Outpatient PET scan recommended, defer to her primary provider  Follow up with JONNY Spann Or attending physician at [facility] upon receipt.    Acute on chronic respiratory failure with hypoxemia (CMS-HCC)  Assessment & Plan  Due to atelectasis, no inpatient CPAP needed per pulmonary  Resolved  Will continue on O2 at the rehab facility    Constipated (present on admission)  Assessment & Plan  Change MiraLAX to daily      DISCHARGE PHYSICAL EXAM    Physical Exam   General/Constitutional: No acute distress.   Head: Normocephalic, atraumatic  ENT: Oral mucosa is moist. No obvious pharyngeal exudates  Eyes: Pink conjunctiva, no scleral icterus  Neck: Supple, no lymphadenopathy  Cardiovascular: Normal rate and regular rhythm. S1,2 noted. No murmurs, gallops or rubs.  Pulmonary: Clear to auscultation bilaterally. No wheezes, rales or  rhonchi.  Abdominal: Soft, nontender, not distended, bowel sounds normoactive. No guarding or peritoneal signs.  Musculoskeletal: No tenderness to palpation of chest wall. LLE cast CDI  Neurologic: Alert and oriented. Grossly nonfocal, moving all extremities. Mild cognitive deficits  Genitourinary: No gross hematuria  Skin: No obvious rash.  Psychiatric: Pleasant, cooperative.  Vitals Reviewed  Labs Reviewed  Imaging reviewed  Nursing notes reviewed    Deanna Ram improved and was deemed ready to be discharged from the hospital as there were no further inpatient needs. Deanna Ram felt comfortable going to [facility]. The discharge plan was discussed with Deanna Ram, and agreed to it. Deanna Ram was subsequently discharged in improved and stable condition.    DISCHARGE MEDICATIONS:    Deanna Ram   Home Medication Instructions AGATA:[id number]    Printed on:08/01/17 1231   Medication Information                      acetaminophen (TYLENOL) 500 MG Tab  Take 500 mg by mouth every 6 hours as needed for Moderate Pain.             alendronate (FOSAMAX) 70 MG Tab  Take 70 mg by mouth every Monday.             aspirin (ASA) 81 MG Chew Tab chewable tablet  Take 81 mg by mouth every morning.             bisacodyl (DULCOLAX) 10 MG Suppos  Insert 1 Suppository in rectum 1 time daily as needed (if magnesium hydroxide ineffective after 24 hours).             glipiZIDE (GLUCOTROL) 5 MG Tab  Take 5 mg by mouth every morning.             HYDROmorphone (DILAUDID) 2 MG Tab  Take 0.5-2 Tabs by mouth every 6 hours as needed for Severe Pain.             insulin lispro (HUMALOG) 100 UNIT/ML Solution  Inject 2-9 Units as instructed 4 Times a Day,Before Meals and at Bedtime.             magnesium hydroxide (MILK OF MAGNESIA) 400 MG/5ML Suspension  Take 30 mL by mouth 1 time daily as needed (if polyethylene glycol ineffective after 24 hours).             metformin (GLUCOPHAGE) 1000 MG tablet  Take  1,000 mg by mouth every morning.             metoprolol SR (TOPROL XL) 50 MG TABLET SR 24 HR  Take 25 mg by mouth every morning.             oxybutynin (DITROPAN) 5 MG Tab  Take 2.5 mg by mouth 2 Times a Day.             pioglitazone (ACTOS) 30 MG Tab  Take 30 mg by mouth every morning.             polyethylene glycol/lytes (MIRALAX) Pack  Take 1 Packet by mouth every day.             pravastatin (PRAVACHOL) 40 MG tablet  Take 40 mg by mouth every evening.             senna-docusate (PERICOLACE OR SENOKOT S) 8.6-50 MG Tab  Take 2 Tabs by mouth 2 Times a Day.             sodium chloride (SALT) 1 GM Tab  Take 1 Tab by mouth 3 times a day, with meals.             Will continue on her O2 as well and have it titrated there.    DISCHARGE VITALS, LABS and IMAGING  Filed Vitals:    08/01/17 0000 08/01/17 0400 08/01/17 0741 08/01/17 1236   BP: 153/68 156/66 116/58 142/61   Pulse: 84 83 86 71   Temp: 35.9 °C (96.7 °F) 36 °C (96.8 °F) 35.8 °C (96.5 °F) 35.7 °C (96.3 °F)   Resp: 17 17 19 19   Height:       Weight:  59.6 kg (131 lb 6.3 oz)     SpO2: 92% 95% 98% 99%     Lab Results   Component Value Date/Time    WBC 9.1 08/01/2017 01:53 AM    RBC 3.35* 08/01/2017 01:53 AM    HEMOGLOBIN 9.4* 08/01/2017 01:53 AM    HEMATOCRIT 30.0* 08/01/2017 01:53 AM    MCV 89.6 08/01/2017 01:53 AM    MCH 28.1 08/01/2017 01:53 AM    MCHC 31.3* 08/01/2017 01:53 AM    MPV 8.9* 08/01/2017 01:53 AM    NEUTROPHILS-POLYS 72.80* 08/01/2017 01:53 AM    LYMPHOCYTES 12.40* 08/01/2017 01:53 AM    MONOCYTES 9.80 08/01/2017 01:53 AM    EOSINOPHILS 3.90 08/01/2017 01:53 AM    BASOPHILS 0.70 08/01/2017 01:53 AM      Lab Results   Component Value Date/Time    SODIUM 131* 08/01/2017 01:53 AM    POTASSIUM 5.3 08/01/2017 01:53 AM    CHLORIDE 97 08/01/2017 01:53 AM    CO2 31 08/01/2017 01:53 AM    GLUCOSE 124* 08/01/2017 01:53 AM    BUN 23* 08/01/2017 01:53 AM    CREATININE 0.78 08/01/2017 01:53 AM      Lab Results   Component Value Date/Time    PT 13.5 07/28/2017  07:00 PM    INR 1.00 07/28/2017 07:00 PM        Ct-chest (thorax) With    7/30/2017 7/30/2017 3:16 PM HISTORY/REASON FOR EXAM:  Chest Mass. TECHNIQUE/EXAM DESCRIPTION: CT scan of the chest with contrast. Helical images were obtained from the lung apices through the adrenal glands following the bolus administration of  75 mL of Omnipaque 350 nonionic contrast. Sagittal and coronal reconstructions were performed. Low dose optimization technique was utilized for this CT exam including automated exposure control and adjustment of the mA and/or kV according to patient size. COMPARISON:  Plain film 7/28/2017. FINDINGS: Atherosclerotic plaque is seen in the aorta. There is coronary calcification. The heart is not enlarged. No pericardial or pleural effusion is seen. No filling defects are noted in the large central pulmonary arteries. Anterior mediastinal lymph node measures 0.9 x 2 cm. A calcification is seen within the lymph node. There are small aortopulmonary lymph nodes. Subcarinal lymph node measures 2 cm in short axis dimension. Left hilar lymph nodes measure up to 8 mm in short axis dimension. Right hilar lymph nodes measure up to 7 mm in short axis dimension. There are small axillary lymph nodes bilaterally. There is volume loss on the right. There is biapical scarring. Emphysematous changes are noted. There is parenchymal scarring at the right lung base. No pneumothorax is identified. There is a nodular opacity at the medial left lung base measuring 1 x 0.9 cm. There is mild lingular atelectasis. There is a nodule in the right upper lobe measuring 6 mm on image 38. Limited views were obtained of the upper abdomen. Common duct measures 9 mm in diameter. Patient is status post left mastectomy. Degenerative changes are seen in the spine.     7/30/2017  Bilateral pulmonary nodules measuring up to 1 cm in the medial left lower lobe. Further evaluation can be obtained with PET/CT. Alternatively, further evaluation can  be obtained with CT chest in 3 months. Right basilar parenchymal scarring. Underlying emphysematous changes. Mediastinal lymphadenopathy is nonspecific and may be infectious/inflammatory or neoplastic. Prominent atherosclerotic plaque. Prominence of the common duct. Further evaluation can be obtained with right upper quadrant ultrasound.     Dx-ankle 2- Views Left    7/29/2017 7/29/2017 7:30 AM HISTORY/REASON FOR EXAM:  ORIF left ankle fracture TECHNIQUE/EXAM DESCRIPTION AND NUMBER OF VIEWS:  2 views of the LEFT ankle. COMPARISON: None FINDINGS: Images demonstrate plate is fixation of the distal fibular fracture. There are 2 screws projecting over the tibial plafond and. There are 2 screws projecting through the medial malleolus. Alignment is anatomic. Fluoroscopy time of 48 seconds  was utilized by RAJ MCCULLOUGH for this procedure.     7/29/2017  Intraoperative fluoroscopic spot images as described above.    Dx-ankle 3+ Views Left    7/28/2017  HISTORY/REASON FOR EXAM:  Pain/Deformity Following Trauma. TECHNIQUE/EXAM DESCRIPTION AND NUMBER OF VIEWS:  LEFT ankle, 3 views, 7/28/2017 7:49 AM. COMPARISON: None. FINDINGS: There is a fracture of the distal fibular shaft approximately 3 cm above the joint line. It shows very mild distraction. There is a fracture of the posterior malleolus which shows depression of 1 to 2 mm. This involves approximately 25% of the posterior articular surface of the tibial plafond and. There is question raised of a very subtle oblique nondisplaced medial malleolar fracture. Ankle mortise is maintained there is no dislocation.     7/28/2017  Trimalleolar fracture of the ankle as above. As only the distal fibular fracture is identified by the ER physician the final results were called to the ER at the time of this dictation. Pablito Banks MD 7/28/2017 8:57 AM    Dx-chest-portable (1 View)    7/28/2017  Addendum: History:  Chest pain and shortness of breath.    7/28/2017 7/28/2017 7:17  PM HISTORY/REASON FOR EXAM:  Chest Pain. 1st of breath. TECHNIQUE/EXAM DESCRIPTION AND NUMBER OF VIEWS: Single portable view of the chest. COMPARISON: None available. FINDINGS: There is elevation of the lateral right hemidiaphragm. The mediastinal and cardiac silhouette is unremarkable for a single view chest. There is atherosclerosis of the aorta. Rounded densities in the right hilum. This could be a prominent vessel. The lung parenchyma is clear. There is no significant pleural effusion. There is no visible pneumothorax. There is postoperative change or posttraumatic change involving the right posterior 5th rib. There is no acute bony process.     7/28/2017  1.  There is no acute cardiopulmonary process. 2.  Rounded density right hilar region which could be a prominent vessel. Underlying mass however is not completely excluded.    Dx-foot-complete 3+ Left    7/28/2017  HISTORY/REASON FOR EXAM:  Pain/Deformity Following Trauma. TECHNIQUE/EXAM DESCRIPTION AND NUMBER OF VIEWS:  LEFT foot, 3 views, 7/28/2017 7:50 AM. COMPARISON: None. FINDINGS: The bones are osteopenic. There is a nondisplaced transverse fracture at the proximal shaft of the base of the fourth metatarsal. No additional fracture or dislocation is seen. There are mild degenerative changes of the midfoot. There is no dislocation.     7/28/2017  Nondisplaced transverse fracture proximal shaft fourth metatarsal. Pablito Banks MD 7/28/2017 8:58 AM    Dx-portable Fluoro > 1 Hour    7/29/2017 7/29/2017 7:30 AM HISTORY/REASON FOR EXAM:  ORIF left ankle fracture TECHNIQUE/EXAM DESCRIPTION AND NUMBER OF VIEWS:  2 views of the LEFT ankle. COMPARISON: None FINDINGS: Images demonstrate plate is fixation of the distal fibular fracture. There are 2 screws projecting over the tibial plafond and. There are 2 screws projecting through the medial malleolus. Alignment is anatomic. Fluoroscopy time of 48 seconds  was utilized by RAJ MCCULLOUGH for this procedure.      7/29/2017  Intraoperative fluoroscopic spot images as described above.    Dx-hip-complete - Unilateral 2+ Left    7/28/2017  HISTORY/REASON FOR EXAM:  Pelvic/Hip Pain Following Trauma. TECHNIQUE/EXAM DESCRIPTION AND NUMBER OF VIEWS: LEFT hip, 3 views, with pelvis, 7/28/2017 7:49 AM COMPARISON: None. FINDINGS: Moderate degenerative changes are noted involving both hips characterized by joint space narrowing and marginal osteophytes. I did not detect an acute fracture or a dislocation. The SI joints appear unremarkable.     7/28/2017  Degenerative changes are present, but I do not identify a hip fracture.      Please see discharge diagnoses, plan and follow up above for details of pending tests and postdischarge instructions for the clinic providers and specialists.    Please CC JONNY Spann, Dr. Maier    For further details on discharge medications, patient education, diet, and activity, please refer to electronic copy of discharge instructions.       TIME SPENT: 40 minutes, with greater than 50% of the time spent on face-to-face encounter, addressing medical issues, coordination of care, counseling, discharge planning, medication reconciliation, and documentation.

## 2017-08-01 NOTE — THERAPY
"Occupational Therapy Evaluation completed.   Functional Status: Pt is mod a supine to sit, max a sit to supine, mod a xfer to BSC, max a toilet hygiene, good maintenance of precautions. Pt limited by anxiety and pain. Pt with attentive family at bedside.   Plan of Care: Will benefit from Occupational Therapy 3 times per week  Discharge Recommendations:  Equipment: Will Continue to Assess for Equipment Needs. Post-acute therapy Discharge to a transitional care facility for continued skilled therapy services.    See \"Rehab Therapy-Acute\" Patient Summary Report for complete documentation.    "

## 2017-08-01 NOTE — DISCHARGE PLANNING
Medical Social Work    Pt medically cleared to D/C to SNF today. Transportation form completed and faxed to [facility] for [facility] transport to [facility].

## 2017-08-01 NOTE — CONSULTS
Pulmonary Progress Note    Name:Deanna Ram  MRN:[id number]    Referring physician: MARTITA Petersen.    Chief Complaint: Nocturnal hypoxia     Interval history      7/31 - dry mouth overnight, no new issues, has some baseline SOB; still on low flow O2. CT chest yesterday: Bilateral pulmonary nodules measuring up to 1 cm in the medial left lower lobe. Further evaluation can be obtained with PET/CT. Alternatively, further evaluation can be obtained with CT chest in 3 months.  Right basilar parenchymal scarring.  Underlying emphysematous changes.  Mediastinal lymphadenopathy is nonspecific and may be infectious/inflammatory or neoplastic.  Prominent atherosclerotic plaque.  Prominence of the common duct. Further evaluation can be obtained with right upper quadrant ultrasound.    8/1 - to ECF today, wears O2 at night; urine less than maximally dilute therefore has SIADH likely            History of Present Illness:  <HPHISTORY>  87 yo pleasant female who is admitted on 7/28/17 following a GLF. Found to have a left trimalleolar ankle fx and is s/p ORIF on 7/29.  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 She was not treated with antimicrobials. She was recently prescribed nocturnal oxygen at 2LPM for unclear reason and its unclear if any testing was done. She does not snore, no witnessed apneic events by her partner, she wakes up 3x every night for nocturia. No daytime somnolence or naps. She is a 25PY smoker, quit >35yrs ago. While inpatient here, was found to be hypoxic while sleeping on 2LPM, increased with slow improvement. In daytime, she has been on 2-3LPM and SPO2 is %. She denies any respiratory complaints. Uses 2 pillows at night as she has kyphosis.    Past Medical History:   has a past medical history of Hypertension; Type II or unspecified type diabetes mellitus without mention of complication, not stated as uncontrolled; Hypercholesteremia; Poor historian;  Chronic atrial fibrillation (CMS-HCC); and Osteoporosis.    Past Surgical History:   has past surgical history that includes mastectomy (left); lobectomy; tonsillectomy and adenoidectomy; mastoidectomy; appendectomy; cholecystectomy; and ankle orif (Left, 7/29/2017).    Allergies:  Morphine and related and Levaquin    Medications:  No current facility-administered medications on file prior to encounter.     Current Outpatient Prescriptions on File Prior to Encounter   Medication Sig Dispense Refill   • HYDROmorphone (DILAUDID) 2 MG Tab Take 0.5-2 Tabs by mouth every 6 hours as needed for Severe Pain. 7 Tab 0   • sulfamethoxazole-trimethoprim (BACTRIM DS) 800-160 MG tablet Take 1 Tab by mouth 2 times a day for 5 days. 10 Tab 0   • alendronate (FOSAMAX) 70 MG Tab Take 70 mg by mouth every Monday.     • metformin (GLUCOPHAGE) 1000 MG tablet Take 1,000 mg by mouth every morning.     • glipiZIDE (GLUCOTROL) 5 MG Tab Take 5 mg by mouth every morning.     • pravastatin (PRAVACHOL) 40 MG tablet Take 40 mg by mouth every evening.         Social History:   reports that she has quit smoking. She has never used smokeless tobacco. She reports that she drinks alcohol. She reports that she does not use illicit drugs.    Family History:  family history is not on file.    ROS:  As per HPI. The rest of systems are negative per AMA and CMMS guidelines.    Physical Examination:  Physical Exam   Constitutional: @[facility]@ is oriented to person, place, and time. @[facility]@ appears well-developed and well-nourished.   HENT:   Head: Normocephalic and atraumatic.   Eyes: Conjunctivae are normal. Pupils are equal, round, and reactive to light.   Neck: Normal range of motion. Neck supple. No tracheal deviation present. No thyromegaly present.   Cardiovascular: Normal rate and regular rhythm.    Pulmonary/Chest: Effort normal and breath sounds normal. No respiratory distress. @[facility]@ has no wheezes.   Abdominal: Soft. Bowel sounds are normal.  @CAP@ exhibits no distension.   Musculoskeletal: Normal range of motion. @CAP@ exhibits no edema.   Neurological: @CAP@ is alert and oriented to person, place, and time. No cranial nerve deficit.   Skin: Skin is warm and dry. @CAP@ is not diaphoretic.       Laboratories:  Recent Labs      07/31/17 0325 08/01/17   0153   WBC  11.5*  9.1   RBC  3.31*  3.35*   HEMOGLOBIN  9.4*  9.4*   HEMATOCRIT  29.8*  30.0*   MCV  90.0  89.6   MCH  28.4  28.1   RDW  51.1*  50.4*   PLATELETCT  300  274   MPV  9.1  8.9*   NEUTSPOLYS   --   72.80*   LYMPHOCYTES   --   12.40*   MONOCYTES   --   9.80   EOSINOPHILS   --   3.90   BASOPHILS   --   0.70     Recent Labs      07/31/17 0325 08/01/17   0153   SODIUM  129*  131*   POTASSIUM  4.7  5.3   CHLORIDE  96  97   CO2  28  31   GLUCOSE  170*  124*   BUN  19  23*       CXR images are reviewed by me. There is an elevated R hemidiaphragm. Additionally, a RML rounded opacity is present.    Impression:  Active Hospital Problems    Diagnosis   • Hyponatremia [E87.1]   • Abnormal CXR [R93.8]   • Acute on chronic respiratory failure with hypoxemia (CMS-HCC) [J96.21]   • Constipated [K59.00]   • Trimalleolar fracture [S82.853A]   • Chronic atrial fibrillation (CMS-HCC) [I48.2]   • UTI (urinary tract infection) [N39.0]   • Type II diabetes mellitus (CMS-HCC) [E11.9]   • Hypertension [I10]   • Hyperlipidemia [E78.5]   • Osteoporosis [M81.0]     Nocturnal hypoxia: presumably from atelectasis as post-op. Hx of RUL lobectomy and currently with radiographic evidence of R hemidiaphragm elevation.   Continue IS, OOB to chair, mobilize as tolerated  Will plan for nocturnal oximetry study  Not candidate for NIPPV at this time as low risk for BRITNEY- can proceed with outpt PSG if needed      Hyponatremia: Chronic in nature according to patient  Etiology unclear, not on thiazides    Hypoxia likely 2/2 age, atx, lobectomy, prior scarring. PFTs as outpatient with follow up with f/u PET/CT or CT chest.  Will also do CNOX as outpatient. Should check thyroid and cortisol level for hypernatremia, osmols.    Dr. Chan Kenney

## 2017-08-01 NOTE — PROGRESS NOTES
"   Orthopaedic Progress Note    Interval changes:  Patient doing well   Patient to DC to SNF later today  Splint CDI    ROS - Patient denies any new issues.  Pain well controlled.    Blood pressure 142/61, pulse 71, temperature 35.7 °C (96.3 °F), resp. rate 19, height 1.549 m (5' 1\"), weight 59.6 kg (131 lb 6.3 oz), SpO2 99 %, not currently breastfeeding.    Patient seen and examined  No acute distress  Breathing non labored  RRR  LLE in short leg splint, DNVI, moves all toes, cap refill < 2 sec.      Recent Labs      07/31/17   0325  08/01/17   0153   WBC  11.5*  9.1   RBC  3.31*  3.35*   HEMOGLOBIN  9.4*  9.4*   HEMATOCRIT  29.8*  30.0*   MCV  90.0  89.6   MCH  28.4  28.1   MCHC  31.5*  31.3*   RDW  51.1*  50.4*   PLATELETCT  300  274   MPV  9.1  8.9*       Active Hospital Problems    Diagnosis   • Hyponatremia [E87.1]   • Abnormal CXR [R93.8]   • Acute on chronic respiratory failure with hypoxemia (CMS-HCC) [J96.21]   • Constipated [K59.00]   • Trimalleolar fracture [S82.853A]   • Chronic atrial fibrillation (CMS-HCC) [I48.2]   • UTI (urinary tract infection) [N39.0]   • Type II diabetes mellitus (CMS-HCC) [E11.9]   • Hypertension [I10]   • Hyperlipidemia [E78.5]   • Osteoporosis [M81.0]       Assessment/Plan:  Cleared by ortho for DC to SNF later today  POD#3 S/P:  1. Open treatment with internal fixation of left trimalleolar ankle fracture with fixation    of posterior lip  2. Physician applied stress examination of left ankle under fluoroscopy  Wt bearing status - NWB LLE   Wound care/Drains - splint/ dressing to be left in place until follow up in two weeks  Future Procedures - none planned  Sutures/Staples out- 10-14 days post operatively  PT/OT-initiated  Antibiotics: completed  DVT Prophylaxis- TEDS/SCDs/Foot pumps/aspirin  Crowder-none  Case Coordination for Discharge Planning - Disposition SNF     "

## 2017-08-01 NOTE — DISCHARGE PLANNING
Medical Social Work    COBRA signed and completed and placed in pt's chart. Bedside and charge RN updated [facility]

## 2017-08-01 NOTE — PROGRESS NOTES
Called [facility], gave report over the phone to nurse Porter. Transport set up for 1430, patient and family aware and at bedside. Discharge order in, reviewing medications, activity, diet, discharge instructions and education with patient and family now. Removing tele and IV. Completing COBRA paperwork.

## 2017-08-01 NOTE — DISCHARGE INSTRUCTIONS
Discharge Instructions    Discharged to [facility] by medical transportation with escort. Discharged via wheelchair, hospital escort: Yes.  Special equipment needed: Oxygen     Be sure to schedule a follow-up appointment with your primary care doctor or any specialists as instructed.     Discharge Plan:   Diet Plan: Discussed  Activity Level: Discussed  Confirmed Follow up Appointment: Appointment Scheduled  Confirmed Symptoms Management: Discussed  Medication Reconciliation Updated: Yes  Pneumococcal Vaccine Given - only chart on this line when given: Given (See MAR)  Influenza Vaccine Indication: Not indicated: Previously immunized this influenza season and > 8 years of age    I understand that a diet low in cholesterol, fat, and sodium is recommended for good health. Unless I have been given specific instructions below for another diet, I accept this instruction as my diet prescription.   Other diet: Diabetic, low cholesterol     Special Instructions: Discharge instructions for the Orthopedic Patient    Follow up with Primary Care Physician within 2 weeks of discharge to home, regarding:  Review of medications and diagnostic testing.  Surveillance for medical complications.  Workup and treatment of osteoporosis, if appropriate.     -Is this a Joint Replacement patient? No    -Is this patient being discharged with medication to prevent blood clots?  Yes, Aspirin Aspirin, ASA oral tablets  What is this medicine?  ASPIRIN (AS pir in) is a pain reliever. It is used to treat mild pain and fever. This medicine is also used as directed by a doctor to prevent and to treat heart attacks, to prevent strokes, and to treat arthritis or inflammation.  This medicine may be used for other purposes; ask your health care provider or pharmacist if you have questions.  COMMON BRAND NAME(S): Aspir-Low, Aspir-Olga Lidia , Aspirtab , Felicitas Advanced Aspirin, Felicitas Aspirin Extra Strength, Felicitas Aspirin Plus, Felicitas Aspirin, Felicitas  Genuine Aspirin, Felicitas Womens Aspirin , Bufferin Extra Strength, Bufferin Low Dose, Bufferin  What should I tell my health care provider before I take this medicine?  They need to know if you have any of these conditions:  -anemia  -asthma  -bleeding problems  -child with chickenpox, the flu, or other viral infection  -diabetes  -gout  -if you frequently drink alcohol containing drinks  -kidney disease  -liver disease  -low level of vitamin K  -lupus  -smoke tobacco  -stomach ulcers or other problems  -an unusual or allergic reaction to aspirin, tartrazine dye, other medicines, dyes, or preservatives  -pregnant or trying to get pregnant  -breast-feeding  How should I use this medicine?  Take this medicine by mouth with a glass of water. Follow the directions on the package or prescription label. You can take this medicine with or without food. If it upsets your stomach, take it with food. Do not take your medicine more often than directed.  Talk to your pediatrician regarding the use of this medicine in children. While this drug may be prescribed for children as young as 12 years of age for selected conditions, precautions do apply. Children and teenagers should not use this medicine to treat chicken pox or flu symptoms unless directed by a doctor.  Patients over 65 years old may have a stronger reaction and need a smaller dose.  Overdosage: If you think you have taken too much of this medicine contact a poison control center or emergency room at once.  NOTE: This medicine is only for you. Do not share this medicine with others.  What if I miss a dose?  If you are taking this medicine on a regular schedule and miss a dose, take it as soon as you can. If it is almost time for your next dose, take only that dose. Do not take double or extra doses.  What may interact with this medicine?  Do not take this medicine with any of the following medications:  -cidofovir  -ketorolac  -probenecid  This medicine may also  interact with the following medications:  -alcohol  -alendronate  -bismuth subsalicylate  -flavocoxid  -herbal supplements like feverfew, garlic, charley, ginkgo biloba, horse chestnut  -medicines for diabetes or glaucoma like acetazolamide, methazolamide  -medicines for gout  -medicines that treat or prevent blood clots like enoxaparin, heparin, ticlopidine, warfarin  -other aspirin and aspirin-like medicines  -NSAIDs, medicines for pain and inflammation, like ibuprofen or naproxen  -pemetrexed  -sulfinpyrazone  -varicella live vaccine  This list may not describe all possible interactions. Give your health care provider a list of all the medicines, herbs, non-prescription drugs, or dietary supplements you use. Also tell them if you smoke, drink alcohol, or use illegal drugs. Some items may interact with your medicine.  What should I watch for while using this medicine?  If you are treating yourself for pain, tell your doctor or health care professional if the pain lasts more than 10 days, if it gets worse, or if there is a new or different kind of pain. Tell your doctor if you see redness or swelling. Also, check with your doctor if you have a fever that lasts for more than 3 days. Only take this medicine to prevent heart attacks or blood clotting if prescribed by your doctor or health care professional.  Do not take aspirin or aspirin-like medicines with this medicine. Too much aspirin can be dangerous. Always read the labels carefully.  This medicine can irritate your stomach or cause bleeding problems. Do not smoke cigarettes or drink alcohol while taking this medicine. Do not lie down for 30 minutes after taking this medicine to prevent irritation to your throat.  If you are scheduled for any medical or dental procedure, tell your healthcare provider that you are taking this medicine. You may need to stop taking this medicine before the procedure.  What side effects may I notice from receiving this  medicine?  Side effects that you should report to your doctor or health care professional as soon as possible:  -allergic reactions like skin rash, itching or hives, swelling of the face, lips, or tongue  -black, tarry stools  -bloody, coffee ground-like vomit  -breathing problems  -changes in hearing, ringing in the ears  -confusion  -general ill feeling or flu-like symptoms  -pain on swallowing  -redness, blistering, peeling or loosening of the skin, including inside the mouth or nose  -trouble passing urine or change in the amount of urine  -unusual bleeding or bruising  -unusually weak or tired  -yellowing of the eyes or skin  Side effects that usually do not require medical attention (report to your doctor or health care professional if they continue or are bothersome):  -diarrhea or constipation  -nausea, vomiting  -stomach gas, heartburn  This list may not describe all possible side effects. Call your doctor for medical advice about side effects. You may report side effects to FDA at 2-250-FDA-0824.  Where should I keep my medicine?  Keep out of the reach of children.  Store at room temperature between 15 and 30 degrees C (59 and 86 degrees F). Protect from heat and moisture. Do not use this medicine if it has a strong vinegar smell. Throw away any unused medicine after the expiration date.  NOTE: This sheet is a summary. It may not cover all possible information. If you have questions about this medicine, talk to your doctor, pharmacist, or health care provider.  © 2014, [employer]. (3/10/2009 10:44:17 AM)      · Is patient discharged on Warfarin / Coumadin?   No     · Is patient Post Blood Transfusion?  No    Fall Prevention in the Home   Falls can cause injuries and can affect people from all age groups. There are many simple things that you can do to make your home safe and to help prevent falls.  WHAT CAN I DO ON THE OUTSIDE OF MY HOME?  · Regularly repair the edges of walkways and driveways  and fix any cracks.  · Remove high doorway thresholds.  · Trim any shrubbery on the main path into your home.  · Use bright outdoor lighting.  · Clear walkways of debris and clutter, including tools and rocks.  · Regularly check that handrails are securely fastened and in good repair. Both sides of any steps should have handrails.  · Install guardrails along the edges of any raised decks or porches.  · Have leaves, snow, and ice cleared regularly.  · Use sand or salt on walkways during winter months.  · In the garage, clean up any spills right away, including grease or oil spills.  WHAT CAN I DO IN THE BATHROOM?  · Use night lights.  · Install grab bars by the toilet and in the tub and shower. Do not use towel bars as grab bars.  · Use non-skid mats or decals on the floor of the tub or shower.  · If you need to sit down while you are in the shower, use a plastic, non-slip stool..  · Keep the floor dry. Immediately clean up any water that spills on the floor.  · Remove soap buildup in the tub or shower on a regular basis.  · Attach bath mats securely with double-sided non-slip rug tape.  · Remove throw rugs and other tripping hazards from the floor.  WHAT CAN I DO IN THE BEDROOM?  · Use night lights.  · Make sure that a bedside light is easy to reach.  · Do not use oversized bedding that drapes onto the floor.  · Have a firm chair that has side arms to use for getting dressed.  · Remove throw rugs and other tripping hazards from the floor.  WHAT CAN I DO IN THE KITCHEN?   · Clean up any spills right away.  · Avoid walking on wet floors.  · Place frequently used items in easy-to-reach places.  · If you need to reach for something above you, use a sturdy step stool that has a grab bar.  · Keep electrical cables out of the way.  · Do not use floor polish or wax that makes floors slippery. If you have to use wax, make sure that it is non-skid floor wax.  · Remove throw rugs and other tripping hazards from the  floor.  WHAT CAN I DO IN THE STAIRWAYS?  · Do not leave any items on the stairs.  · Make sure that there are handrails on both sides of the stairs. Fix handrails that are broken or loose. Make sure that handrails are as long as the stairways.  · Check any carpeting to make sure that it is firmly attached to the stairs. Fix any carpet that is loose or worn.  · Avoid having throw rugs at the top or bottom of stairways, or secure the rugs with carpet tape to prevent them from moving.  · Make sure that you have a light switch at the top of the stairs and the bottom of the stairs. If you do not have them, have them installed.  WHAT ARE SOME OTHER FALL PREVENTION TIPS?  · Wear closed-toe shoes that fit well and support your feet. Wear shoes that have rubber soles or low heels.  · When you use a stepladder, make sure that it is completely opened and that the sides are firmly locked. Have someone hold the ladder while you are using it. Do not climb a closed stepladder.  · Add color or contrast paint or tape to grab bars and handrails in your home. Place contrasting color strips on the first and last steps.  · Use mobility aids as needed, such as canes, walkers, scooters, and crutches.  · Turn on lights if it is dark. Replace any light bulbs that burn out.  · Set up furniture so that there are clear paths. Keep the furniture in the same spot.  · Fix any uneven floor surfaces.  · Choose a carpet design that does not hide the edge of steps of a stairway.  · Be aware of any and all pets.  · Review your medicines with your healthcare provider. Some medicines can cause dizziness or changes in blood pressure, which increase your risk of falling.  Talk with your health care provider about other ways that you can decrease your risk of falls. This may include working with a physical therapist or  to improve your strength, balance, and endurance.     This information is not intended to replace advice given to you by your health  care provider. Make sure you discuss any questions you have with your health care provider.     Document Released: 12/08/2003 Document Revised: 05/03/2016 Document Reviewed: 01/22/2016  [employer] Interactive Patient Education ©2016 [employer]        Atrial Fibrillation  Atrial fibrillation is a type of irregular heart rhythm (arrhythmia). During atrial fibrillation, the upper chambers of the heart (atria) quiver continuously in a chaotic pattern. This causes an irregular and often rapid heart rate.   Atrial fibrillation is the result of the heart becoming overloaded with disorganized signals that tell it to beat. These signals are normally released one at a time by a part of the right atrium called the sinoatrial node. They then travel from the atria to the lower chambers of the heart (ventricles), causing the atria and ventricles to contract and pump blood as they pass. In atrial fibrillation, parts of the atria outside of the sinoatrial node also release these signals. This results in two problems. First, the atria receive so many signals that they do not have time to fully contract. Second, the ventricles, which can only receive one signal at a time, beat irregularly and out of rhythm with the atria.   There are three types of atrial fibrillation:   · Paroxysmal. Paroxysmal atrial fibrillation starts suddenly and stops on its own within a week.  · Persistent. Persistent atrial fibrillation lasts for more than a week. It may stop on its own or with treatment.  · Permanent. Permanent atrial fibrillation does not go away. Episodes of atrial fibrillation may lead to permanent atrial fibrillation.  Atrial fibrillation can prevent your heart from pumping blood normally. It increases your risk of stroke and can lead to heart failure.   CAUSES   · Heart conditions, including a heart attack, heart failure, coronary artery disease, and heart valve conditions.    · Inflammation of the sac that surrounds the heart  (pericarditis).  · Blockage of an artery in the lungs (pulmonary embolism).  · Pneumonia or other infections.  · Chronic lung disease.  · Thyroid problems, especially if the thyroid is overactive (hyperthyroidism).  · Caffeine, excessive alcohol use, and use of some illegal drugs.    · Use of some medicines, including certain decongestants and diet pills.  · Heart surgery.    · Birth defects.    Sometimes, no cause can be found. When this happens, the atrial fibrillation is called lone atrial fibrillation. The risk of complications from atrial fibrillation increases if you have lone atrial fibrillation and you are age 60 years or older.  RISK FACTORS  · Heart failure.  · Coronary artery disease.  · Diabetes mellitus.    · High blood pressure (hypertension).    · Obesity.    · Other arrhythmias.    · Increased age.  SIGNS AND SYMPTOMS   · A feeling that your heart is beating rapidly or irregularly.    · A feeling of discomfort or pain in your chest.    · Shortness of breath.    · Sudden light-headedness or weakness.    · Getting tired easily when exercising.    · Urinating more often than normal (mainly when atrial fibrillation first begins).    In paroxysmal atrial fibrillation, symptoms may start and suddenly stop.  DIAGNOSIS   Your health care provider may be able to detect atrial fibrillation when taking your pulse. Your health care provider may have you take a test called an ambulatory electrocardiogram (ECG). An ECG records your heartbeat patterns over a 24-hour period. You may also have other tests, such as:  · Transthoracic echocardiogram (TTE). During echocardiography, sound waves are used to evaluate how blood flows through your heart.  · Transesophageal echocardiogram (TASHA).  · Stress test. There is more than one type of stress test. If a stress test is needed, ask your health care provider about which type is best for you.  · Chest X-ray exam.  · Blood tests.  · Computed tomography (CT).  TREATMENT    Treatment may include:  · Treating any underlying conditions. For example, if you have an overactive thyroid, treating the condition may correct atrial fibrillation.  · Taking medicine. Medicines may be given to control a rapid heart rate or to prevent blood clots, heart failure, or a stroke.  · Having a procedure to correct the rhythm of the heart:  · Electrical cardioversion. During electrical cardioversion, a controlled, low-energy shock is delivered to the heart through your skin. If you have chest pain, very low blood pressure, or sudden heart failure, this procedure may need to be done as an emergency.  · Catheter ablation. During this procedure, heart tissues that send the signals that cause atrial fibrillation are destroyed.  · Surgical ablation. During this surgery, thin lines of heart tissue that carry the abnormal signals are destroyed. This procedure can either be an open-heart surgery or a minimally invasive surgery. With the minimally invasive surgery, small cuts are made to access the heart instead of a large opening.  · Pulmonary venous isolation. During this surgery, tissue around the veins that carry blood from the lungs (pulmonary veins) is destroyed. This tissue is thought to carry the abnormal signals.  HOME CARE INSTRUCTIONS   · Take medicines only as directed by your health care provider. Some medicines can make atrial fibrillation worse or recur.  · If blood thinners were prescribed by your health care provider, take them exactly as directed. Too much blood-thinning medicine can cause bleeding. If you take too little, you will not have the needed protection against stroke and other problems.  · Perform blood tests at home if directed by your health care provider. Perform blood tests exactly as directed.  · Quit smoking if you smoke.  · Do not drink alcohol.  · Do not drink caffeinated beverages such as coffee, soda, and some teas. You may drink decaffeinated coffee, soda, or tea.     · Maintain a healthy weight. Do not use diet pills unless your health care provider approves. They may make heart problems worse.    · Follow diet instructions as directed by your health care provider.  · Exercise regularly as directed by your health care provider.  · Keep all follow-up visits as directed by your health care provider. This is important.  PREVENTION   The following substances can cause atrial fibrillation to recur:   · Caffeinated beverages.  · Alcohol.  · Certain medicines, especially those used for breathing problems.  · Certain herbs and herbal medicines, such as those containing ephedra or ginseng.  · Illegal drugs, such as cocaine and amphetamines.  Sometimes medicines are given to prevent atrial fibrillation from recurring. Proper treatment of any underlying condition is also important in helping prevent recurrence.   SEEK MEDICAL CARE IF:  · You notice a change in the rate, rhythm, or strength of your heartbeat.  · You suddenly begin urinating more frequently.  · You tire more easily when exerting yourself or exercising.  SEEK IMMEDIATE MEDICAL CARE IF:   · You have chest pain, abdominal pain, sweating, or weakness.  · You feel nauseous.  · You have shortness of breath.  · You suddenly have swollen feet and ankles.  · You feel dizzy.  · Your face or limbs feel numb or weak.  · You have a change in your vision or speech.  MAKE SURE YOU:   · Understand these instructions.  · Will watch your condition.  · Will get help right away if you are not doing well or get worse.     This information is not intended to replace advice given to you by your health care provider. Make sure you discuss any questions you have with your health care provider.     Document Released: 12/18/2006 Document Revised: 01/08/2016 Document Reviewed: 04/13/2016  [employer] Interactive Patient Education ©2016 [employer]        Hypertension  Hypertension, commonly called high blood pressure, is when the force of blood  pumping through your arteries is too strong. Your arteries are the blood vessels that carry blood from your heart throughout your body. A blood pressure reading consists of a higher number over a lower number, such as 110/72. The higher number (systolic) is the pressure inside your arteries when your heart pumps. The lower number (diastolic) is the pressure inside your arteries when your heart relaxes. Ideally you want your blood pressure below 120/80.  Hypertension forces your heart to work harder to pump blood. Your arteries may become narrow or stiff. Having untreated or uncontrolled hypertension can cause heart attack, stroke, kidney disease, and other problems.  RISK FACTORS  Some risk factors for high blood pressure are controllable. Others are not.   Risk factors you cannot control include:   · Race. You may be at higher risk if you are .  · Age. Risk increases with age.  · Gender. Men are at higher risk than women before age 45 years. After age 65, women are at higher risk than men.  Risk factors you can control include:  · Not getting enough exercise or physical activity.  · Being overweight.  · Getting too much fat, sugar, calories, or salt in your diet.  · Drinking too much alcohol.  SIGNS AND SYMPTOMS  Hypertension does not usually cause signs or symptoms. Extremely high blood pressure (hypertensive crisis) may cause headache, anxiety, shortness of breath, and nosebleed.  DIAGNOSIS  To check if you have hypertension, your health care provider will measure your blood pressure while you are seated, with your arm held at the level of your heart. It should be measured at least twice using the same arm. Certain conditions can cause a difference in blood pressure between your right and left arms. A blood pressure reading that is higher than normal on one occasion does not mean that you need treatment. If it is not clear whether you have high blood pressure, you may be asked to return on a  different day to have your blood pressure checked again. Or, you may be asked to monitor your blood pressure at home for 1 or more weeks.  TREATMENT  Treating high blood pressure includes making lifestyle changes and possibly taking medicine. Living a healthy lifestyle can help lower high blood pressure. You may need to change some of your habits.  Lifestyle changes may include:  · Following the DASH diet. This diet is high in fruits, vegetables, and whole grains. It is low in salt, red meat, and added sugars.  · Keep your sodium intake below 2,300 mg per day.  · Getting at least 30-45 minutes of aerobic exercise at least 4 times per week.  · Losing weight if necessary.  · Not smoking.  · Limiting alcoholic beverages.  · Learning ways to reduce stress.  Your health care provider may prescribe medicine if lifestyle changes are not enough to get your blood pressure under control, and if one of the following is true:  · You are 18-59 years of age and your systolic blood pressure is above 140.  · You are 60 years of age or older, and your systolic blood pressure is above 150.  · Your diastolic blood pressure is above 90.  · You have diabetes, and your systolic blood pressure is over 140 or your diastolic blood pressure is over 90.  · You have kidney disease and your blood pressure is above 140/90.  · You have heart disease and your blood pressure is above 140/90.  Your personal target blood pressure may vary depending on your medical conditions, your age, and other factors.  HOME CARE INSTRUCTIONS  · Have your blood pressure rechecked as directed by your health care provider.    · Take medicines only as directed by your health care provider. Follow the directions carefully. Blood pressure medicines must be taken as prescribed. The medicine does not work as well when you skip doses. Skipping doses also puts you at risk for problems.  · Do not smoke.    · Monitor your blood pressure at home as directed by your health care  provider.   SEEK MEDICAL CARE IF:   · You think you are having a reaction to medicines taken.  · You have recurrent headaches or feel dizzy.  · You have swelling in your ankles.  · You have trouble with your vision.  SEEK IMMEDIATE MEDICAL CARE IF:  · You develop a severe headache or confusion.  · You have unusual weakness, numbness, or feel faint.  · You have severe chest or abdominal pain.  · You vomit repeatedly.  · You have trouble breathing.  MAKE SURE YOU:   · Understand these instructions.  · Will watch your condition.  · Will get help right away if you are not doing well or get worse.     This information is not intended to replace advice given to you by your health care provider. Make sure you discuss any questions you have with your health care provider.     Document Released: 12/18/2006 Document Revised: 05/03/2016 Document Reviewed: 10/10/2014  [employer] Interactive Patient Education ©2016 [employer]            Depression / Suicide Risk    As you are discharged from this [facility] facility, it is important to learn how to keep safe from harming yourself.    Recognize the warning signs:  · Abrupt changes in personality, positive or negative- including increase in energy   · Giving away possessions  · Change in eating patterns- significant weight changes-  positive or negative  · Change in sleeping patterns- unable to sleep or sleeping all the time   · Unwillingness or inability to communicate  · Depression  · Unusual sadness, discouragement and loneliness  · Talk of wanting to die  · Neglect of personal appearance   · Rebelliousness- reckless behavior  · Withdrawal from people/activities they love  · Confusion- inability to concentrate     If you or a loved one observes any of these behaviors or has concerns about self-harm, here's what you can do:  · Talk about it- your feelings and reasons for harming yourself  · Remove any means that you might use to hurt yourself (examples: pills, rope,  extension cords, firearm)  · Get professional help from the community (Mental Health, Substance Abuse, psychological counseling)  · Do not be alone:Call your Safe Contact- someone whom you trust who will be there for you.  · Call your local CRISIS HOTLINE 541-1100 or 836-423-6741  · Call your local Children's Mobile Crisis Response Team [place] (930) 122-1574 or www.[employer]  · Call the toll free National Suicide Prevention Hotlines   · National Suicide Prevention Lifeline 189-408-SEPT (6716)  · National Hope Line Network 800-SUICIDE (068-3362)

## 2017-08-01 NOTE — PROGRESS NOTES
Mrs. Ram discharged to [facility] at 1450. [facility] transport members Domitila Santiago and Saad Schwartz were identified by hospital badges as her transporters.

## 2017-08-01 NOTE — PROGRESS NOTES
NOCTURNAL OXIMETRY STUDY machine is currently not available. Pt was placed on 2 L NC per order and was satting 99% for 5 consecutive minutes while asleep. For questions, please call RT charge at ex. [phone]

## 2018-02-12 NOTE — ASSESSMENT & PLAN NOTE
Patient Information     Patient Name MRN Alondra Esqueda [id number] Female 2016      Patient Instructions by Marbella Perez NP at 2018 11:15 AM     Author:  Marbella Perez NP Service:  (none) Author Type:  PHYS- Nurse Practitioner     Filed:  2018 11:38 AM Encounter Date:  2018 Status:  Signed     :  Marbella Perez NP (PHYS- Nurse Practitioner)               Index Related topics   Eye Infection, Bacterial   What is a bacterial eye infection?   When bacteria causes an eye infection, the eye drains a yellow discharge (pus). This condition is also called bacterial conjunctivitis, runny eyes, or mattery eyes.  Your child may have:    Yellow discharge in the eye    Eyelids stuck together with pus, especially after sleeping    Some redness in the white part of the eyes    Puffy eyelids  Note: A small amount of cream-colored mucus in the inner corner of the eyes after sleeping can be normal.  What is the cause?   Eye infections with pus are caused by bacteria and can be a complication of a cold. Pink eyes without a yellow discharge, however, are more common and are due to a virus.  How long does it last?   With antibiotic eye drops, the yellow discharge should clear up in 72 hours. The red eyes (which are due to the cold) may continue for several more days.  How can I take care of my child?     Cleaning the eye   Before putting in any medicines, remove all the pus from the eye with warm water and wet cotton balls. Unless this is done, the medicine will not have a chance to work.    Antibiotic eye drops or ointments   This infection must be treated with an antibiotic eye medicine prescribed by your child's healthcare provider.  Putting eye drops or ointment in the eyes of young children can be a real wright. Ideally it's done with two adults. One person can hold the child still while the other person opens the eyelids with one hand and puts in the  Status post open treatment with internal fixation of left trimalleolar ankle fracture with fixation    of posterior lip 7/29.   D/c to [facility]  Follow up with JONNY Spann Or attending physician at [facility] upon receipt.  Follow up to Dr. Maier, Orthopedics in 1 week for postop visit   medicine with the other. One person can do it alone if she sits on the floor holding the child's head (face up) between the knees to free both hands to put in the medication.  Eye drops: If your healthcare provider has prescribed antibiotic eye drops, put 1 drop in each eye every 4 hours while your child is awake. Do this by gently pulling down on the lower lid and placing the drops there. As soon as the eye drops have been put in the eyes, have your child close them for 2 minutes so the eye drops will stay inside. If it is difficult to separate your child's eyelids, put the eye drops over the inner corner of the eye while he is lying down. When your child opens his eye and blinks, the eye drops will flow in. Continue the eye drops until your child has awakened 2 mornings in a row without any pus in the eyes.  Ointment: If your healthcare provider has prescribed antibiotic eye ointment, the ointment needs to be used just 4 times a day because it can remain in the eyes longer than eye drops. Separate the eyelids and put in a ribbon of ointment along the lower eyelid from one corner of the eye to the other. If it is very difficult to separate your child's eyelids, put the ointment on the edges of the eyelids. As the ointment melts from body heat, it will flow onto the eyeball. Continue until 2 mornings have passed without any pus in the eye.    Contact lenses  Children with contact lenses need to switch to glasses temporarily. This will prevent damage to the cornea.    Contagiousness   The pus from the eyes can cause eye infections in other people if they get some of it on their eyes. Therefore, it is very important for the sick child to have his own washcloth and towel. He should be encouraged not to touch or rub his eyes because it can make his infection last longer. Touching his eyes also puts a lot of germs on his fingers. Your child's hands should be washed often to prevent spreading the infection.  After using  eye drops for 24 hours, and if the pus is minimal, children can return to day care or school.  When should I call my child's healthcare provider?  Call IMMEDIATELY if:    The outer eyelids become very red or swollen.    The eye becomes painful.    The vision becomes blurred.    Your child starts acting very sick.  Call within 24 hours if:    The infection isn't cleared up after 3 days of treatment.    Your child develops an earache.    You have other concerns or questions.  Written by Roverto Santos MD, author of  My Child Is Sick,  American Academy of Pediatrics Books.  Pediatric Advisor 2017.2 published by [employer].  Last modified: 2009-11-23  Last reviewed: 2016  This content is reviewed periodically and is subject to change as new health information becomes available. The information is intended to inform and educate and is not a replacement for medical evaluation, advice, diagnosis or treatment by a healthcare professional.  Pediatric Advisor 2017.2 Index    Copyright  4005-9198 Rovreto Santos MD [facility]. All rights reserved.

## 2018-05-09 PROBLEM — R53.1 GENERALIZED WEAKNESS: Status: ACTIVE | Noted: 2018-05-09

## 2018-05-09 PROBLEM — R26.2 DIFFICULTY WALKING: Status: ACTIVE | Noted: 2018-05-09

## 2018-05-09 PROBLEM — Z74.09 DECREASED FUNCTIONAL MOBILITY AND ENDURANCE: Status: ACTIVE | Noted: 2018-05-09

## 2020-07-09 ENCOUNTER — APPOINTMENT (OUTPATIENT)
Dept: CARDIOLOGY | Facility: CLINIC | Age: 85
End: 2020-07-09
Payer: MEDICARE

## 2021-01-27 DIAGNOSIS — Z23 NEED FOR VACCINATION: ICD-10-CM

## 2022-06-12 ENCOUNTER — HOSPITAL ENCOUNTER (EMERGENCY)
Facility: MEDICAL CENTER | Age: 87
End: 2022-06-12
Attending: EMERGENCY MEDICINE
Payer: COMMERCIAL

## 2022-06-12 VITALS
DIASTOLIC BLOOD PRESSURE: 65 MMHG | HEART RATE: 60 BPM | OXYGEN SATURATION: 94 % | SYSTOLIC BLOOD PRESSURE: 160 MMHG | HEIGHT: 61 IN | RESPIRATION RATE: 19 BRPM | BODY MASS INDEX: 21.52 KG/M2 | TEMPERATURE: 97.4 F | WEIGHT: 114 LBS

## 2022-06-12 DIAGNOSIS — N30.00 ACUTE CYSTITIS WITHOUT HEMATURIA: ICD-10-CM

## 2022-06-12 LAB
APPEARANCE UR: ABNORMAL
BACTERIA #/AREA URNS HPF: ABNORMAL /HPF
BILIRUB UR QL STRIP.AUTO: NEGATIVE
COLOR UR: YELLOW
EPI CELLS #/AREA URNS HPF: NEGATIVE /HPF
GLUCOSE UR STRIP.AUTO-MCNC: 100 MG/DL
HYALINE CASTS #/AREA URNS LPF: ABNORMAL /LPF
KETONES UR STRIP.AUTO-MCNC: NEGATIVE MG/DL
LEUKOCYTE ESTERASE UR QL STRIP.AUTO: ABNORMAL
MICRO URNS: ABNORMAL
NITRITE UR QL STRIP.AUTO: NEGATIVE
PH UR STRIP.AUTO: 6 [PH] (ref 5–8)
PROT UR QL STRIP: 100 MG/DL
RBC # URNS HPF: ABNORMAL /HPF
RBC UR QL AUTO: ABNORMAL
SP GR UR STRIP.AUTO: 1.02
UROBILINOGEN UR STRIP.AUTO-MCNC: 0.2 MG/DL
WBC #/AREA URNS HPF: ABNORMAL /HPF

## 2022-06-12 PROCEDURE — 81001 URINALYSIS AUTO W/SCOPE: CPT

## 2022-06-12 PROCEDURE — 99284 EMERGENCY DEPT VISIT MOD MDM: CPT | Mod: 25

## 2022-06-12 PROCEDURE — 700102 HCHG RX REV CODE 250 W/ 637 OVERRIDE(OP): Performed by: EMERGENCY MEDICINE

## 2022-06-12 PROCEDURE — A9270 NON-COVERED ITEM OR SERVICE: HCPCS | Performed by: EMERGENCY MEDICINE

## 2022-06-12 RX ORDER — PHENAZOPYRIDINE HYDROCHLORIDE 200 MG/1
100 TABLET, FILM COATED ORAL ONCE
Status: COMPLETED | OUTPATIENT
Start: 2022-06-12 | End: 2022-06-12

## 2022-06-12 RX ORDER — CEPHALEXIN 500 MG/1
500 CAPSULE ORAL 3 TIMES DAILY
Qty: 21 CAPSULE | Refills: 0 | Status: SHIPPED | OUTPATIENT
Start: 2022-06-12 | End: 2022-06-19

## 2022-06-12 RX ORDER — CEPHALEXIN 500 MG/1
500 CAPSULE ORAL ONCE
Status: COMPLETED | OUTPATIENT
Start: 2022-06-12 | End: 2022-06-12

## 2022-06-12 RX ORDER — PHENAZOPYRIDINE HYDROCHLORIDE 200 MG/1
200 TABLET, FILM COATED ORAL 3 TIMES DAILY PRN
Qty: 5 TABLET | Refills: 0 | Status: SHIPPED | OUTPATIENT
Start: 2022-06-12

## 2022-06-12 RX ADMIN — CEPHALEXIN 500 MG: 500 CAPSULE ORAL at 17:54

## 2022-06-12 RX ADMIN — PHENAZOPYRIDINE HYDROCHLORIDE 100 MG: 200 TABLET ORAL at 17:54

## 2022-06-12 NOTE — ED TRIAGE NOTES
"Chief Complaint   Patient presents with   • Painful Urination     Since morning. Pt denies flank pain or fevers. But states she has hx of UTI         Pt BIB EMS from [facility] home. Pt aox4, GCS 15. Pt on baseline oxygen        BP (!) 162/69   Pulse 62   Temp 36.3 °C (97.3 °F) (Temporal)   Resp 16   Ht 1.549 m (5' 1\")   Wt 51.7 kg (114 lb)   SpO2 98%   BMI 21.54 kg/m²     "

## 2022-06-13 NOTE — ED PROVIDER NOTES
ED Provider Note    CHIEF COMPLAINT  Chief Complaint   Patient presents with   • Painful Urination     Since morning. Pt denies flank pain or fevers. But states she has hx of UTI       HPI  Deanna Ram is a 89 y.o. female who presents with a chief complaint of dysuria that started this morning.  She denies any pain in her flank or fevers.  She has no hematuria.  She suspects that she has a urinary tract infection.  She has not tried any medication for her symptoms.    REVIEW OF SYSTEMS  See HPI for further details.  Dysuria.  All other systems are negative.     PAST MEDICAL HISTORY   has a past medical history of Chronic atrial fibrillation (HCC), Hypercholesteremia, Hypertension, Osteoporosis, Poor historian, and Type II or unspecified type diabetes mellitus without mention of complication, not stated as uncontrolled.    SOCIAL HISTORY  Social History     Tobacco Use   • Smoking status: Former Smoker   • Smokeless tobacco: Never Used   Substance and Sexual Activity   • Alcohol use: Yes     Comment: rare   • Drug use: No   • Sexual activity: Not on file       SURGICAL HISTORY   has a past surgical history that includes mastectomy (left); lobectomy; tonsillectomy and adenoidectomy; mastoidectomy; appendectomy; cholecystectomy; and orif, ankle (Left, 7/29/2017).    CURRENT MEDICATIONS  Home Medications     Reviewed by Monet Alberts R.N. (Registered Nurse) on 06/12/22 at 1658  Med List Status: Partial   Medication Last Dose Status   acetaminophen (TYLENOL) 500 MG Tab  Active   alendronate (FOSAMAX) 70 MG Tab  Active   aspirin (ASA) 81 MG Chew Tab chewable tablet  Active   bisacodyl (DULCOLAX) 10 MG Suppos  Active   glipiZIDE (GLUCOTROL) 5 MG Tab  Active   HYDROmorphone (DILAUDID) 2 MG Tab  Active   insulin lispro (HUMALOG) 100 UNIT/ML Solution  Active   magnesium hydroxide (MILK OF MAGNESIA) 400 MG/5ML Suspension  Active   metformin (GLUCOPHAGE) 1000 MG tablet  Active   metoprolol SR (TOPROL XL) 50 MG TABLET  "SR 24 HR  Active   oxybutynin (DITROPAN) 5 MG Tab  Active   pioglitazone (ACTOS) 30 MG Tab  Active   polyethylene glycol/lytes (MIRALAX) Pack  Active   pravastatin (PRAVACHOL) 40 MG tablet  Active   senna-docusate (PERICOLACE OR SENOKOT S) 8.6-50 MG Tab  Active   sodium chloride (SALT) 1 GM Tab  Active                ALLERGIES  Allergies   Allergen Reactions   • Morphine And Related [Codeine] Nausea   • Levaquin Rash     Patient can't remember what happens when she takes it.         PHYSICAL EXAM  VITAL SIGNS: BP (!) 162/69   Pulse 62   Temp 36.3 °C (97.3 °F) (Temporal)   Resp 16   Ht 1.549 m (5' 1\")   Wt 51.7 kg (114 lb)   SpO2 98%   BMI 21.54 kg/m²    Pulse ox interpretation: I interpret this pulse ox as normal.  Constitutional: Alert in no apparent distress.  HENT: No signs of trauma, Bilateral external ears normal, Nose normal.  Moist mucous membranes.  Eyes: Pupils are equal and reactive, Conjunctiva normal, Non-icteric.   Neck: Normal range of motion, No tenderness, Supple, No stridor.   Lymphatic: No lymphadenopathy noted.   Cardiovascular: Regular rate with irregular rhythm, no murmurs.   Thorax & Lungs: Normal breath sounds, No respiratory distress, No wheezing.   Abdomen: Bowel sounds normal, Soft, mild suprapubic discomfort, No masses, No pulsatile masses. No peritoneal signs.  Skin: Warm, Dry, No erythema, No rash.   Back: No CVA tenderness.   Extremities: No cyanosis.  Musculoskeletal: No major deformities noted.   Neurologic: Alert, No focal deficits noted.   Psychiatric: Affect normal, Judgment normal, Mood normal.     DIAGNOSTIC STUDIES / PROCEDURES    LABS  Results for orders placed or performed during the hospital encounter of 06/12/22   URINALYSIS    Specimen: Urine, Clean Catch   Result Value Ref Range    Color Yellow     Character Turbid (A)     Specific Gravity 1.018 <1.035    Ph 6.0 5.0 - 8.0    Glucose 100 (A) Negative mg/dL    Ketones Negative Negative mg/dL    Protein 100 (A) " Negative mg/dL    Bilirubin Negative Negative    Urobilinogen, Urine 0.2 Negative    Nitrite Negative Negative    Leukocyte Esterase Large (A) Negative    Occult Blood Moderate (A) Negative    Micro Urine Req Microscopic    URINE MICROSCOPIC (W/UA)   Result Value Ref Range    WBC Packed (A) /hpf    RBC 10-20 (A) /hpf    Bacteria Many (A) None /hpf    Epithelial Cells Negative /hpf    Hyaline Cast 0-2 /lpf       COURSE & MEDICAL DECISION MAKING  Pertinent Labs & Imaging studies reviewed. (See chart for details)  This is a 89-year-old female who is here with dysuria that started this morning.  She has had no fever or flank pain to suggest pyelonephritis.  Here she is slightly hypertensive but afebrile with otherwise normal vital signs.  She has very mild suprapubic tenderness to deep palpation but no peritoneal signs.  She has no CVA tenderness.  Her urine is infected with large leukocyte esterase, many bacteria, and packed white blood cells.  Given that she appears so stable and does not have any systemic symptoms I do not think blood work would add additional information necessary for appropriate treatment.  I think this is unlikely to represent nephrolithiasis.  We will trial Keflex and Pyridium at home.  She was given a dose of both here in the ER.  She was educated about the urine color changes with the Pyridium.  She was discharged in good and stable condition with instructions to follow-up with her primary care physician within the next 24 to 48 hours for recheck.    The patient will return for worsening symptoms and is stable at the time of discharge. The patient verbalizes understanding and will comply.    FINAL IMPRESSION  1. Acute cystitis without hematuria  cephALEXin (KEFLEX) 500 MG Cap    phenazopyridine (PYRIDIUM) 200 MG Tab         Electronically signed by: Siva Guajardo M.D., 6/12/2022 5:02 PM

## 2022-06-13 NOTE — ED NOTES
Discharge instructions reviewed with patient and signed. IV was removed from arm. They were instructed on how to  prescriptions. They verbalized understanding of follow up instructions. All belongings with patient. They ambulate with a walker. [facility]

## 2022-06-13 NOTE — DISCHARGE INSTRUCTIONS
You were seen in the ER for pain with urination.  Your urine does appear to be infected.  We have given you a dose of antibiotics in the ER and I have given you a prescription for the same, please take these as directed.  I have also given you a small prescription for a medicine that will help with the discomfort when you urinate.  You can take Tylenol for additional pain control if needed.  If you develop new or worsening symptoms please return to the ER.  Good luck, I hope you feel better soon!

## 2022-06-27 ENCOUNTER — HOSPITAL ENCOUNTER (EMERGENCY)
Facility: MEDICAL CENTER | Age: 87
End: 2022-06-27
Attending: EMERGENCY MEDICINE
Payer: COMMERCIAL

## 2022-06-27 VITALS
HEIGHT: 61 IN | HEART RATE: 62 BPM | DIASTOLIC BLOOD PRESSURE: 88 MMHG | TEMPERATURE: 97.7 F | RESPIRATION RATE: 16 BRPM | OXYGEN SATURATION: 96 % | SYSTOLIC BLOOD PRESSURE: 162 MMHG | WEIGHT: 114 LBS | BODY MASS INDEX: 21.52 KG/M2

## 2022-06-27 DIAGNOSIS — N30.00 ACUTE CYSTITIS WITHOUT HEMATURIA: ICD-10-CM

## 2022-06-27 LAB
ALBUMIN SERPL BCP-MCNC: 3.6 G/DL (ref 3.2–4.9)
ALBUMIN/GLOB SERPL: 1 G/DL
ALP SERPL-CCNC: 56 U/L (ref 30–99)
ALT SERPL-CCNC: 15 U/L (ref 2–50)
ANION GAP SERPL CALC-SCNC: 8 MMOL/L (ref 7–16)
APPEARANCE UR: CLEAR
AST SERPL-CCNC: 18 U/L (ref 12–45)
BACTERIA #/AREA URNS HPF: ABNORMAL /HPF
BASOPHILS # BLD AUTO: 0.9 % (ref 0–1.8)
BASOPHILS # BLD: 0.08 K/UL (ref 0–0.12)
BILIRUB SERPL-MCNC: 0.2 MG/DL (ref 0.1–1.5)
BILIRUB UR QL STRIP.AUTO: NEGATIVE
BUN SERPL-MCNC: 20 MG/DL (ref 8–22)
CALCIUM SERPL-MCNC: 9.1 MG/DL (ref 8.5–10.5)
CHLORIDE SERPL-SCNC: 97 MMOL/L (ref 96–112)
CO2 SERPL-SCNC: 29 MMOL/L (ref 20–33)
COLOR UR: YELLOW
CREAT SERPL-MCNC: 0.89 MG/DL (ref 0.5–1.4)
EOSINOPHIL # BLD AUTO: 0.15 K/UL (ref 0–0.51)
EOSINOPHIL NFR BLD: 1.7 % (ref 0–6.9)
ERYTHROCYTE [DISTWIDTH] IN BLOOD BY AUTOMATED COUNT: 48.9 FL (ref 35.9–50)
GFR SERPLBLD CREATININE-BSD FMLA CKD-EPI: 61 ML/MIN/1.73 M 2
GLOBULIN SER CALC-MCNC: 3.5 G/DL (ref 1.9–3.5)
GLUCOSE SERPL-MCNC: 227 MG/DL (ref 65–99)
GLUCOSE UR STRIP.AUTO-MCNC: 500 MG/DL
HCT VFR BLD AUTO: 34.1 % (ref 37–47)
HGB BLD-MCNC: 10.7 G/DL (ref 12–16)
IMM GRANULOCYTES # BLD AUTO: 0.03 K/UL (ref 0–0.11)
IMM GRANULOCYTES NFR BLD AUTO: 0.3 % (ref 0–0.9)
KETONES UR STRIP.AUTO-MCNC: NEGATIVE MG/DL
LEUKOCYTE ESTERASE UR QL STRIP.AUTO: ABNORMAL
LYMPHOCYTES # BLD AUTO: 1.64 K/UL (ref 1–4.8)
LYMPHOCYTES NFR BLD: 18.7 % (ref 22–41)
MCH RBC QN AUTO: 28.5 PG (ref 27–33)
MCHC RBC AUTO-ENTMCNC: 31.4 G/DL (ref 33.6–35)
MCV RBC AUTO: 90.9 FL (ref 81.4–97.8)
MICRO URNS: ABNORMAL
MONOCYTES # BLD AUTO: 0.61 K/UL (ref 0–0.85)
MONOCYTES NFR BLD AUTO: 6.9 % (ref 0–13.4)
NEUTROPHILS # BLD AUTO: 6.28 K/UL (ref 2–7.15)
NEUTROPHILS NFR BLD: 71.5 % (ref 44–72)
NITRITE UR QL STRIP.AUTO: POSITIVE
NRBC # BLD AUTO: 0 K/UL
NRBC BLD-RTO: 0 /100 WBC
PH UR STRIP.AUTO: 7 [PH] (ref 5–8)
PLATELET # BLD AUTO: 252 K/UL (ref 164–446)
PMV BLD AUTO: 9.8 FL (ref 9–12.9)
POTASSIUM SERPL-SCNC: 5.4 MMOL/L (ref 3.6–5.5)
PROT SERPL-MCNC: 7.1 G/DL (ref 6–8.2)
PROT UR QL STRIP: NEGATIVE MG/DL
RBC # BLD AUTO: 3.75 M/UL (ref 4.2–5.4)
RBC # URNS HPF: ABNORMAL /HPF
RBC UR QL AUTO: ABNORMAL
SODIUM SERPL-SCNC: 134 MMOL/L (ref 135–145)
SP GR UR STRIP.AUTO: 1.01
UROBILINOGEN UR STRIP.AUTO-MCNC: 0.2 MG/DL
WBC # BLD AUTO: 8.8 K/UL (ref 4.8–10.8)
WBC #/AREA URNS HPF: ABNORMAL /HPF

## 2022-06-27 PROCEDURE — A9270 NON-COVERED ITEM OR SERVICE: HCPCS | Performed by: EMERGENCY MEDICINE

## 2022-06-27 PROCEDURE — 81001 URINALYSIS AUTO W/SCOPE: CPT

## 2022-06-27 PROCEDURE — 99284 EMERGENCY DEPT VISIT MOD MDM: CPT

## 2022-06-27 PROCEDURE — 85025 COMPLETE CBC W/AUTO DIFF WBC: CPT

## 2022-06-27 PROCEDURE — 80053 COMPREHEN METABOLIC PANEL: CPT

## 2022-06-27 PROCEDURE — 700102 HCHG RX REV CODE 250 W/ 637 OVERRIDE(OP): Performed by: EMERGENCY MEDICINE

## 2022-06-27 RX ORDER — SULFAMETHOXAZOLE AND TRIMETHOPRIM 800; 160 MG/1; MG/1
1 TABLET ORAL 2 TIMES DAILY
Qty: 6 TABLET | Refills: 0 | Status: SHIPPED | OUTPATIENT
Start: 2022-06-27 | End: 2022-06-27 | Stop reason: SDUPTHER

## 2022-06-27 RX ORDER — SULFAMETHOXAZOLE AND TRIMETHOPRIM 800; 160 MG/1; MG/1
1 TABLET ORAL ONCE
Status: COMPLETED | OUTPATIENT
Start: 2022-06-27 | End: 2022-06-27

## 2022-06-27 RX ORDER — SULFAMETHOXAZOLE AND TRIMETHOPRIM 800; 160 MG/1; MG/1
1 TABLET ORAL 2 TIMES DAILY
Qty: 6 TABLET | Refills: 0 | Status: SHIPPED | OUTPATIENT
Start: 2022-06-27 | End: 2022-06-30

## 2022-06-27 RX ORDER — CEFTRIAXONE 2 G/1
2 INJECTION, POWDER, FOR SOLUTION INTRAMUSCULAR; INTRAVENOUS ONCE
Status: DISCONTINUED | OUTPATIENT
Start: 2022-06-27 | End: 2022-06-28 | Stop reason: HOSPADM

## 2022-06-27 RX ADMIN — SULFAMETHOXAZOLE AND TRIMETHOPRIM 1 TABLET: 800; 160 TABLET ORAL at 21:45

## 2022-06-28 NOTE — ED PROVIDER NOTES
ED Physician Note    Chief Concern:   Painful urination    HPI:  Deanna Ram is a very pleasant 89-year-old woman who presents to the emergency department for evaluation of suprapubic pressure and pain with urination.  Her symptoms began earlier today, few hours prior to arrival.  She states her symptoms feel very similar to previous urinary tract infections.  She has not had any fevers, she reports no flank pain, no nausea, and no vomiting.  She does not have any abdominal pain or pressure unless she is actively urinating.  She does not know all of her home medications, but does have a history of chronic lung disease, states that she does wear oxygen at home.    Review of Systems:  See HPI for pertinent positives and negatives. All other systems negative.    Past Medical History:   has a past medical history of Chronic atrial fibrillation (HCC), Hypercholesteremia, Hypertension, Osteoporosis, Poor historian, and Type II or unspecified type diabetes mellitus without mention of complication, not stated as uncontrolled.    Social History:  Social History     Tobacco Use   • Smoking status: Former Smoker   • Smokeless tobacco: Never Used   Vaping Use   • Vaping Use: Never used   Substance and Sexual Activity   • Alcohol use: Yes     Comment: rare   • Drug use: No   • Sexual activity: Not on file       Surgical History:   has a past surgical history that includes mastectomy (left); lobectomy; tonsillectomy and adenoidectomy; mastoidectomy; appendectomy; cholecystectomy; and orif, ankle (Left, 7/29/2017).    Current Medications:  Home Medications     Reviewed by Ludmila Marion R.N. (Registered Nurse) on 06/27/22 at 1847  Med List Status: Not Addressed   Medication Last Dose Status   acetaminophen (TYLENOL) 500 MG Tab  Active   alendronate (FOSAMAX) 70 MG Tab  Active   aspirin (ASA) 81 MG Chew Tab chewable tablet  Active   bisacodyl (DULCOLAX) 10 MG Suppos  Active   glipiZIDE (GLUCOTROL) 5 MG Tab  Active  "  HYDROmorphone (DILAUDID) 2 MG Tab  Active   insulin lispro (HUMALOG) 100 UNIT/ML Solution  Active   magnesium hydroxide (MILK OF MAGNESIA) 400 MG/5ML Suspension  Active   metformin (GLUCOPHAGE) 1000 MG tablet  Active   metoprolol SR (TOPROL XL) 50 MG TABLET SR 24 HR  Active   oxybutynin (DITROPAN) 5 MG Tab  Active   phenazopyridine (PYRIDIUM) 200 MG Tab  Active   pioglitazone (ACTOS) 30 MG Tab  Active   polyethylene glycol/lytes (MIRALAX) Pack  Active   pravastatin (PRAVACHOL) 40 MG tablet  Active   senna-docusate (PERICOLACE OR SENOKOT S) 8.6-50 MG Tab  Active   sodium chloride (SALT) 1 GM Tab  Active                Allergies:  Allergies   Allergen Reactions   • Morphine And Related [Codeine] Nausea   • Levaquin Rash     Patient can't remember what happens when she takes it.     • Macrobid [Nitrofurantoin]      nausea       Physical Exam:  Vital Signs: BP (!) 208/85   Pulse 60   Temp 36.4 °C (97.5 °F) (Temporal)   Resp 18   Ht 1.549 m (5' 1\")   Wt 51.7 kg (114 lb)   SpO2 99%   BMI 21.54 kg/m²   Constitutional: Alert, no acute distress  HENT: Normocephalic, mask in place  Eyes: Pupils equal and reactive, normal conjunctiva  Neck: Supple, normal range of motion, no stridor  Cardiovascular: Extremities are warm and well perfused, no murmur appreciated, normal cardiac auscultation  Pulmonary: No respiratory distress, normal work of breathing, no accessory muscule usage, breath sounds quiet and equal bilaterally  Abdomen: Soft, non-distended, non-tender to palpation, no peritoneal signs, bladder nonpalpable, no suprapubic tenderness to palpation  Skin: Warm, dry, no rashes or lesions  Musculoskeletal: Normal range of motion in all extremities, no swelling or deformity noted  Neurologic: Alert, oriented, normal speech, normal motor function  Psychiatric: Normal and appropriate mood and affect    Medical records reviewed for continuity of care.  Ms. Ram was seen in this emergency department 6/12/2022.  She " presented with concern for dysuria.  Urinalysis was nitrite negative with packed white blood cells and many bacteria.  She was treated with Keflex and Pyridium, discharged home in stable condition. Urine culture grew out pan sensitive E. coli.    Labs:  Labs Reviewed   CBC WITH DIFFERENTIAL - Abnormal; Notable for the following components:       Result Value    RBC 3.75 (*)     Hemoglobin 10.7 (*)     Hematocrit 34.1 (*)     MCHC 31.4 (*)     Lymphocytes 18.70 (*)     All other components within normal limits   COMP METABOLIC PANEL - Abnormal; Notable for the following components:    Sodium 134 (*)     Glucose 227 (*)     All other components within normal limits   URINALYSIS - Abnormal; Notable for the following components:    Glucose 500 (*)     Nitrite Positive (*)     Leukocyte Esterase Small (*)     Occult Blood Small (*)     All other components within normal limits   URINE MICROSCOPIC (W/UA) - Abnormal; Notable for the following components:    WBC  (*)     RBC 5-10 (*)     Bacteria Few (*)     All other components within normal limits   ESTIMATED GFR       Radiology:  No orders to display        ED Medications Administered:  Medications   cefTRIAXone (Rocephin) injection 2 g (has no administration in time range)       Differential diagnosis:  Urinary tract infection, Sirs, sepsis, pyelonephritis, other intra-abdominal infection    MDM:  Ms. Ram presents to the emergency department with less than 24 hours of suprapubic pressure only with urination, and burning with urination.  She states her symptoms feel similar to a urinary tract infection she had a few weeks ago.  She was treated with antibiotics at that time and her symptoms did completely resolved, but recurred today.  On arrival to the emergency department she is afebrile, she has no tachycardia, she has no hypotension.  She is mildly hypertensive, though seems to be hypertensive at baseline based on prior visits.    On laboratory evaluation  urinalysis is nitrite positive with few bacteria and  white blood cells.  She has a normal white blood count, no flank pain, no evidence of Sirs or sepsis. CMP with no significant abnormalities.      At this time, suspect her symptoms are likely due to cystitis.  No flank pain, no clinical symptoms suggestive of pyelonephritis.  I do not believe she requires inpatient hospitalization.  I reviewed her most recent culture and sensitivity that showed pansensitive E. coli.  She is treated with Rocephin in the emergency department, discharged with a prescription for Bactrim.  She will follow-up with her primary care physician. Return precautions were discussed with the patient, and provided in written form with the patient's discharge instructions.     Personal protective equipment including N95 surgical respirator, goggles, and gloves were used during this encounter.       Disposition:  Discharge home in stable condition    Final Impression:  1. Acute cystitis without hematuria        Electronically signed by: Linda Dorado MD, 6/27/2022 9:01 PM

## 2022-06-28 NOTE — ED TRIAGE NOTES
"Chief Complaint   Patient presents with   • Painful Urination     Pt reports \"pressure\" and painful urination that started today. Pt treated for UTI 2 weeks ago.     BP (!) 208/85   Pulse 60   Temp 36.4 °C (97.5 °F) (Temporal)   Resp 18   Ht 1.549 m (5' 1\")   Wt 51.7 kg (114 lb)   SpO2 99%   BMI 21.54 kg/m²     Pt brought in by [facility] from [facility] to [address], mask in place. Pt in a gown and on monitor. Chart flagged for ERP to see.     pta.  "

## 2022-06-28 NOTE — DISCHARGE INSTRUCTIONS
Please take the antibiotics as prescribed, you were given your first dose in the emergency department today.  You may begin the prescription tomorrow morning.  Return to the emergency department if you develop any new or worsening symptoms including worsening pain, fevers, back pain, or any further concerns.  Please follow-up with your primary care physician within 24 to 48 hours for complete recheck.

## 2023-03-05 ENCOUNTER — APPOINTMENT (OUTPATIENT)
Dept: RADIOLOGY | Facility: MEDICAL CENTER | Age: 88
End: 2023-03-05
Attending: EMERGENCY MEDICINE
Payer: MEDICARE

## 2023-03-05 ENCOUNTER — HOSPITAL ENCOUNTER (EMERGENCY)
Facility: MEDICAL CENTER | Age: 88
End: 2023-03-05
Attending: EMERGENCY MEDICINE
Payer: MEDICARE

## 2023-03-05 VITALS
DIASTOLIC BLOOD PRESSURE: 79 MMHG | HEART RATE: 59 BPM | BODY MASS INDEX: 21.52 KG/M2 | WEIGHT: 114 LBS | OXYGEN SATURATION: 99 % | TEMPERATURE: 98.2 F | SYSTOLIC BLOOD PRESSURE: 190 MMHG | RESPIRATION RATE: 13 BRPM | HEIGHT: 61 IN

## 2023-03-05 DIAGNOSIS — R55 VASOVAGAL SYNCOPE: ICD-10-CM

## 2023-03-05 LAB
ALBUMIN SERPL BCP-MCNC: 3.6 G/DL (ref 3.2–4.9)
ALBUMIN/GLOB SERPL: 1 G/DL
ALP SERPL-CCNC: 83 U/L (ref 30–99)
ALT SERPL-CCNC: 25 U/L (ref 2–50)
ANION GAP SERPL CALC-SCNC: 12 MMOL/L (ref 7–16)
APPEARANCE UR: CLEAR
AST SERPL-CCNC: 33 U/L (ref 12–45)
BASOPHILS # BLD AUTO: 0.7 % (ref 0–1.8)
BASOPHILS # BLD: 0.05 K/UL (ref 0–0.12)
BILIRUB SERPL-MCNC: 0.2 MG/DL (ref 0.1–1.5)
BILIRUB UR QL STRIP.AUTO: NEGATIVE
BUN SERPL-MCNC: 30 MG/DL (ref 8–22)
CALCIUM ALBUM COR SERPL-MCNC: 9.4 MG/DL (ref 8.5–10.5)
CALCIUM SERPL-MCNC: 9.1 MG/DL (ref 8.5–10.5)
CHLORIDE SERPL-SCNC: 98 MMOL/L (ref 96–112)
CO2 SERPL-SCNC: 24 MMOL/L (ref 20–33)
COLOR UR: YELLOW
CREAT SERPL-MCNC: 1.01 MG/DL (ref 0.5–1.4)
EKG IMPRESSION: NORMAL
EOSINOPHIL # BLD AUTO: 0.17 K/UL (ref 0–0.51)
EOSINOPHIL NFR BLD: 2.2 % (ref 0–6.9)
ERYTHROCYTE [DISTWIDTH] IN BLOOD BY AUTOMATED COUNT: 47.6 FL (ref 35.9–50)
GFR SERPLBLD CREATININE-BSD FMLA CKD-EPI: 52 ML/MIN/1.73 M 2
GLOBULIN SER CALC-MCNC: 3.7 G/DL (ref 1.9–3.5)
GLUCOSE SERPL-MCNC: 177 MG/DL (ref 65–99)
GLUCOSE UR STRIP.AUTO-MCNC: NEGATIVE MG/DL
HCT VFR BLD AUTO: 39.3 % (ref 37–47)
HGB BLD-MCNC: 12 G/DL (ref 12–16)
IMM GRANULOCYTES # BLD AUTO: 0.04 K/UL (ref 0–0.11)
IMM GRANULOCYTES NFR BLD AUTO: 0.5 % (ref 0–0.9)
KETONES UR STRIP.AUTO-MCNC: NEGATIVE MG/DL
LACTATE SERPL-SCNC: 1.5 MMOL/L (ref 0.5–2)
LEUKOCYTE ESTERASE UR QL STRIP.AUTO: NEGATIVE
LYMPHOCYTES # BLD AUTO: 1.84 K/UL (ref 1–4.8)
LYMPHOCYTES NFR BLD: 24.1 % (ref 22–41)
MCH RBC QN AUTO: 28.7 PG (ref 27–33)
MCHC RBC AUTO-ENTMCNC: 30.5 G/DL (ref 33.6–35)
MCV RBC AUTO: 94 FL (ref 81.4–97.8)
MICRO URNS: NORMAL
MONOCYTES # BLD AUTO: 0.56 K/UL (ref 0–0.85)
MONOCYTES NFR BLD AUTO: 7.3 % (ref 0–13.4)
NEUTROPHILS # BLD AUTO: 4.99 K/UL (ref 2–7.15)
NEUTROPHILS NFR BLD: 65.2 % (ref 44–72)
NITRITE UR QL STRIP.AUTO: NEGATIVE
NRBC # BLD AUTO: 0 K/UL
NRBC BLD-RTO: 0 /100 WBC
PH UR STRIP.AUTO: 5 [PH] (ref 5–8)
PLATELET # BLD AUTO: 256 K/UL (ref 164–446)
PMV BLD AUTO: 10.1 FL (ref 9–12.9)
POTASSIUM SERPL-SCNC: 4.6 MMOL/L (ref 3.6–5.5)
PROT SERPL-MCNC: 7.3 G/DL (ref 6–8.2)
PROT UR QL STRIP: NEGATIVE MG/DL
RBC # BLD AUTO: 4.18 M/UL (ref 4.2–5.4)
RBC UR QL AUTO: NEGATIVE
SODIUM SERPL-SCNC: 134 MMOL/L (ref 135–145)
SP GR UR STRIP.AUTO: 1.01
TROPONIN T SERPL-MCNC: 54 NG/L (ref 6–19)
UROBILINOGEN UR STRIP.AUTO-MCNC: 0.2 MG/DL
WBC # BLD AUTO: 7.7 K/UL (ref 4.8–10.8)

## 2023-03-05 PROCEDURE — 70450 CT HEAD/BRAIN W/O DYE: CPT

## 2023-03-05 PROCEDURE — 36415 COLL VENOUS BLD VENIPUNCTURE: CPT

## 2023-03-05 PROCEDURE — 81003 URINALYSIS AUTO W/O SCOPE: CPT

## 2023-03-05 PROCEDURE — 80053 COMPREHEN METABOLIC PANEL: CPT

## 2023-03-05 PROCEDURE — 87186 SC STD MICRODIL/AGAR DIL: CPT

## 2023-03-05 PROCEDURE — 85025 COMPLETE CBC W/AUTO DIFF WBC: CPT

## 2023-03-05 PROCEDURE — 71045 X-RAY EXAM CHEST 1 VIEW: CPT

## 2023-03-05 PROCEDURE — 87077 CULTURE AEROBIC IDENTIFY: CPT

## 2023-03-05 PROCEDURE — 99285 EMERGENCY DEPT VISIT HI MDM: CPT

## 2023-03-05 PROCEDURE — 87086 URINE CULTURE/COLONY COUNT: CPT

## 2023-03-05 PROCEDURE — 700102 HCHG RX REV CODE 250 W/ 637 OVERRIDE(OP): Performed by: EMERGENCY MEDICINE

## 2023-03-05 PROCEDURE — 87040 BLOOD CULTURE FOR BACTERIA: CPT | Mod: 91

## 2023-03-05 PROCEDURE — 700105 HCHG RX REV CODE 258: Performed by: EMERGENCY MEDICINE

## 2023-03-05 PROCEDURE — 83605 ASSAY OF LACTIC ACID: CPT

## 2023-03-05 PROCEDURE — 93005 ELECTROCARDIOGRAM TRACING: CPT | Performed by: EMERGENCY MEDICINE

## 2023-03-05 PROCEDURE — A9270 NON-COVERED ITEM OR SERVICE: HCPCS | Performed by: EMERGENCY MEDICINE

## 2023-03-05 PROCEDURE — 84484 ASSAY OF TROPONIN QUANT: CPT

## 2023-03-05 RX ORDER — SODIUM CHLORIDE, SODIUM LACTATE, POTASSIUM CHLORIDE, AND CALCIUM CHLORIDE .6; .31; .03; .02 G/100ML; G/100ML; G/100ML; G/100ML
1000 INJECTION, SOLUTION INTRAVENOUS ONCE
Status: COMPLETED | OUTPATIENT
Start: 2023-03-05 | End: 2023-03-05

## 2023-03-05 RX ORDER — METOPROLOL SUCCINATE 50 MG/1
50 TABLET, EXTENDED RELEASE ORAL ONCE
Status: DISCONTINUED | OUTPATIENT
Start: 2023-03-05 | End: 2023-03-06 | Stop reason: HOSPADM

## 2023-03-05 RX ADMIN — SODIUM CHLORIDE, POTASSIUM CHLORIDE, SODIUM LACTATE AND CALCIUM CHLORIDE 1000 ML: 600; 310; 30; 20 INJECTION, SOLUTION INTRAVENOUS at 18:04

## 2023-03-05 RX ADMIN — LISINOPRIL 30 MG: 10 TABLET ORAL at 21:18

## 2023-03-05 ASSESSMENT — HEART SCORE
TROPONIN: LESS THAN OR EQUAL TO NORMAL LIMIT
ECG: NON-SPECIFIC REPOLARIZATION DISTURBANCE
AGE: 65+
HISTORY: SLIGHTLY SUSPICIOUS
HEART SCORE: 4
RISK FACTORS: 1-2 RISK FACTORS

## 2023-03-05 ASSESSMENT — FIBROSIS 4 INDEX: FIB4 SCORE: 1.678292783356547317

## 2023-03-06 NOTE — ED TRIAGE NOTES
Chief Complaint   Patient presents with    Seizure     PT BIB EMS from [facility] where pt had a witnessed episode  of shaking for approximately 30 seconds while going to the bathroom. Approx time 1500  Pt states she had one prior seizure last winter but does not currently take any medications for it. PT Aox4 and GCS 15 currently

## 2023-03-06 NOTE — ED NOTES
This RN spoke to RN grace [facility]. RN from [facility] states pt did not have post ictal period. Her arms were shaking however pt able to answer all questions immediately.

## 2023-03-06 NOTE — DISCHARGE PLANNING
[facility].    PCS and Facesheet Faxed to [facility]. Told them that pt will go to [id number], and room B306.    ETA pickup at 23:30.    Bedside RNRuben, notified.

## 2023-03-06 NOTE — ED NOTES
Pt medicated per mar and connected to [facility].   Pt asked to give urine sample and state will go when available.

## 2023-03-06 NOTE — ED NOTES
Paperwork from pt's facility showing she takes lisinopril 30mg twice a day  She was given her morning dose but not the afternoon yet  30mg lisinopril ordered per verbal ok from ERP

## 2023-03-06 NOTE — ED NOTES
Pt aox4, vss, nad, ERP comfortable discharging pt with elevated BP readings, pt was medicated with home dose of lisinopril and is trending down  Pt understood all dc info, [facility]  [facility]  20G rac iv taken out, tip intact

## 2023-03-06 NOTE — ED PROVIDER NOTES
ED Provider Note    CHIEF COMPLAINT  Chief Complaint   Patient presents with    Seizure     PT BIB EMS from [facility] where pt had a witnessed episode  of shaking for approximately 30 seconds while going to the bathroom. Approx time 1500  Pt states she had one prior seizure last winter but does not currently take any medications for it. PT Aox4 and GCS 15 currently        EXTERNAL RECORDS REVIEWED  Outpatient labs & studies patient was seen in [facility] recently in June for urinary tract infection.  She received physical therapy in 2021 for weakness and decreased mobility    HPI/ROS  LIMITATION TO HISTORY   Select: : None  OUTSIDE HISTORIAN(S):  Family the patient's daughter and  who states [facility] and takes 3 different blood pressure medications 1 of which she just started this week for spikes in her blood pressure.  They also state that she is on 3 diabetes medications.  He states that she has a pacemaker for atrial fibrillation.    Deanna Ram is a 89 y.o. female who [facility] after having a syncopal episode and low blood pressure.  The patient states that she was watching the game on TV when she had to go to the bathroom.  She walked into the bathroom and started getting cramping in her stomach.  She then felt very nauseous and vomited.  She states that she felt very out of it.  From the report the patient's family got from the nursing home staff she had some shaking and unresponsiveness and her blood pressure was low.  She did not fall or hit her head.  She has not noticed any pain with urination or blood in her urine.  She has not had any diarrhea.  She currently has no abdominal pain or cramping.  She denies any pain in her chest shortness of breath coughing or cold symptoms.  She has no headache.  She denies any extremity weakness or numbness.  The patient takes hydroxyzine, isosorbide mononitrate every 24 hours, lidocaine patch,  melatonin, metoprolol 50 mg every 24 hours, wife is aised, lisinopril, multivitamin, albuterol, clonidine 0.1 mg every 24 hours as needed for blood pressure greater than 180 systolic loperamide, tramadol as needed and Zofran as needed.  Patient states that she smoked but quit 47 years ago.  She denies any alcohol or drug use.  Patient does have a POLST with her that states she is a DNR DNI with no heroic measures.    PAST MEDICAL HISTORY   has a past medical history of Chronic atrial fibrillation (HCC), Hypercholesteremia, Hypertension, Osteoporosis, Poor historian, and Type II or unspecified type diabetes mellitus without mention of complication, not stated as uncontrolled.    SURGICAL HISTORY   has a past surgical history that includes mastectomy (left); lobectomy; tonsillectomy and adenoidectomy; mastoidectomy; appendectomy; cholecystectomy; and orif, ankle (Left, 7/29/2017).    FAMILY HISTORY  No family history on file.    SOCIAL HISTORY  Social History     Tobacco Use    Smoking status: Former    Smokeless tobacco: Never   Vaping Use    Vaping Use: Never used   Substance and Sexual Activity    Alcohol use: Yes     Comment: rare    Drug use: No    Sexual activity: Not on file       CURRENT MEDICATIONS  Home Medications       Reviewed by Sheila Galvez R.N. (Registered Nurse) on 03/05/23 at 1612  Med List Status: Not Addressed     Medication Last Dose Status   acetaminophen (TYLENOL) 500 MG Tab  Active   alendronate (FOSAMAX) 70 MG Tab  Active   aspirin (ASA) 81 MG Chew Tab chewable tablet  Active   bisacodyl (DULCOLAX) 10 MG Suppos  Active   glipiZIDE (GLUCOTROL) 5 MG Tab  Active   HYDROmorphone (DILAUDID) 2 MG Tab  Active   insulin lispro (HUMALOG) 100 UNIT/ML Solution  Active   magnesium hydroxide (MILK OF MAGNESIA) 400 MG/5ML Suspension  Active   metformin (GLUCOPHAGE) 1000 MG tablet  Active   metoprolol SR (TOPROL XL) 50 MG TABLET SR 24 HR  Active   oxybutynin (DITROPAN) 5 MG Tab  Active   phenazopyridine  "(PYRIDIUM) 200 MG Tab  Active   pioglitazone (ACTOS) 30 MG Tab  Active   polyethylene glycol/lytes (MIRALAX) Pack  Active   pravastatin (PRAVACHOL) 40 MG tablet  Active   senna-docusate (PERICOLACE OR SENOKOT S) 8.6-50 MG Tab  Active   sodium chloride (SALT) 1 GM Tab  Active                    ALLERGIES  Allergies   Allergen Reactions    Morphine And Related [Codeine] Nausea    Levaquin Rash     Patient can't remember what happens when she takes it.      Macrobid [Nitrofurantoin]      nausea       PHYSICAL EXAM  VITAL SIGNS: BP (!) 142/64   Pulse 71   Temp 35.9 °C (96.6 °F) (Temporal)   Resp 14   Ht 1.549 m (5' 1\")   Wt 51.7 kg (114 lb)   SpO2 94%   BMI 21.54 kg/m²    Constitutional: Well developed, Well nourished, No acute distress, Non-toxic appearance.   HEENT: Normocephalic, Atraumatic,  external ears normal, pharynx pink,  Mucous  Membranes dry, No rhinorrhea or mucosal edema  Eyes: PERRL, EOMI, Conjunctiva normal, No discharge.   Neck: Normal range of motion, No tenderness, Supple, No stridor.   Lymphatic: No lymphadenopathy    Cardiovascular: Irregularly irregular rate and rhythm no murmurs rubs or gallops pacemaker left chest wall  Thorax & Lungs: Lungs clear to auscultation bilaterally, No respiratory distress, No wheezes, rhales or rhonchi, No chest wall tenderness.   Abdomen: Bowel sounds normal, Soft, non tender, non distended,  No pulsatile masses., no rebound guarding or peritoneal signs.   Skin: Warm, Dry, No erythema, No rash,   Back:  No CVA tenderness,  No spinal tenderness, bony crepitance step offs or instability.   Extremities: Equal, intact distal pulses, No cyanosis, clubbing or edema,  No tenderness.   Musculoskeletal: Good range of motion in all major joints. No tenderness to palpation or major deformities noted.   Neurologic: Alert & oriented x 3, Cranial nerves II-XII intact, Equal strength and sensation upper and lower extremities bilaterally,  No focal deficits noted.  NIH is " 0  Psychiatric: Affect normal, Judgment normal, Mood normal.     DIAGNOSTIC STUDIES / PROCEDURES  EKG  I have independently interpreted this EKG  See below    LABS  Results for orders placed or performed during the hospital encounter of 03/05/23   LACTIC ACID   Result Value Ref Range    Lactic Acid 1.5 0.5 - 2.0 mmol/L   CBC WITH DIFFERENTIAL   Result Value Ref Range    WBC 7.7 4.8 - 10.8 K/uL    RBC 4.18 (L) 4.20 - 5.40 M/uL    Hemoglobin 12.0 12.0 - 16.0 g/dL    Hematocrit 39.3 37.0 - 47.0 %    MCV 94.0 81.4 - 97.8 fL    MCH 28.7 27.0 - 33.0 pg    MCHC 30.5 (L) 33.6 - 35.0 g/dL    RDW 47.6 35.9 - 50.0 fL    Platelet Count 256 164 - 446 K/uL    MPV 10.1 9.0 - 12.9 fL    Neutrophils-Polys 65.20 44.00 - 72.00 %    Lymphocytes 24.10 22.00 - 41.00 %    Monocytes 7.30 0.00 - 13.40 %    Eosinophils 2.20 0.00 - 6.90 %    Basophils 0.70 0.00 - 1.80 %    Immature Granulocytes 0.50 0.00 - 0.90 %    Nucleated RBC 0.00 /100 WBC    Neutrophils (Absolute) 4.99 2.00 - 7.15 K/uL    Lymphs (Absolute) 1.84 1.00 - 4.80 K/uL    Monos (Absolute) 0.56 0.00 - 0.85 K/uL    Eos (Absolute) 0.17 0.00 - 0.51 K/uL    Baso (Absolute) 0.05 0.00 - 0.12 K/uL    Immature Granulocytes (abs) 0.04 0.00 - 0.11 K/uL    NRBC (Absolute) 0.00 K/uL   COMP METABOLIC PANEL   Result Value Ref Range    Sodium 134 (L) 135 - 145 mmol/L    Potassium 4.6 3.6 - 5.5 mmol/L    Chloride 98 96 - 112 mmol/L    Co2 24 20 - 33 mmol/L    Anion Gap 12.0 7.0 - 16.0    Glucose 177 (H) 65 - 99 mg/dL    Bun 30 (H) 8 - 22 mg/dL    Creatinine 1.01 0.50 - 1.40 mg/dL    Calcium 9.1 8.5 - 10.5 mg/dL    AST(SGOT) 33 12 - 45 U/L    ALT(SGPT) 25 2 - 50 U/L    Alkaline Phosphatase 83 30 - 99 U/L    Total Bilirubin 0.2 0.1 - 1.5 mg/dL    Albumin 3.6 3.2 - 4.9 g/dL    Total Protein 7.3 6.0 - 8.2 g/dL    Globulin 3.7 (H) 1.9 - 3.5 g/dL    A-G Ratio 1.0 g/dL   URINALYSIS    Specimen: Urine   Result Value Ref Range    Color Yellow     Character Clear     Specific Gravity 1.013 <1.035    Ph  5.0 5.0 - 8.0    Glucose Negative Negative mg/dL    Ketones Negative Negative mg/dL    Protein Negative Negative mg/dL    Bilirubin Negative Negative    Urobilinogen, Urine 0.2 Negative    Nitrite Negative Negative    Leukocyte Esterase Negative Negative    Occult Blood Negative Negative    Micro Urine Req see below    TROPONIN   Result Value Ref Range    Troponin T 54 (H) 6 - 19 ng/L   CORRECTED CALCIUM   Result Value Ref Range    Correct Calcium 9.4 8.5 - 10.5 mg/dL   ESTIMATED GFR   Result Value Ref Range    GFR (CKD-EPI) 52 (A) >60 mL/min/1.73 m 2   EKG   Result Value Ref Range    Report       [facility]    Test Date:  2023  Pt Name:    CLARITA CARRIZALES                Department: ER  MRN:        [id number]                      Room:        04  Gender:     Female                       Technician: [id number]  :        [id number]                   Requested By:HITESH THOMAS  Order #:    [id number]                    Reading MD: HITESH THOMAS MD    Measurements  Intervals                                Axis  Rate:       60                           P:  NH:         150                          QRS:        41  QRSD:       100                          T:          -42  QT:         470  QTc:        470    Interpretive Statements  Atrial-paced rhythm  Nonspecific T abnormalities, inferior leads  Compared to ECG 2017 19:44:35  T-wave abnormality now present  Atrial fibrillation no longer present  Electronically Signed On 3-5-2023 21:28:36 PST by HITESH THOMAS MD          RADIOLOGY  I have independently interpreted the diagnostic imaging associated with this visit and am waiting the final reading from the radiologist.   My preliminary interpretation is as follows: Portable chest x-ray: No infiltrates or effusions or evidence of congestive heart failure CT head: Atrophy  Radiologist interpretation:   CT-HEAD W/O   Final Result      1.  There is no acute intracranial hemorrhage.   2.   There is asymmetric low density extra-axial fluid in the frontotemporal regions resulting in 5 mm of left-to-right midline shift.   3.  There is mild atrophy and nonspecific white matter disease.         DX-CHEST-PORTABLE (1 VIEW)   Final Result      No evidence of acute cardiopulmonary process.            COURSE & MEDICAL DECISION MAKING    ED Observation Status? Yes; I am placing the patient in to an observation status due to a diagnostic uncertainty as well as therapeutic intensity. Patient placed in observation status at 4:28 PM, 3/5/2023.     Observation plan is as follows: CT of the head to rule out intracranial hemorrhage or stroke.  Portable chest x-ray to evaluate for congestive heart failure due to her hypotensive episode.  CBC CMP blood cultures and urinalysis to evaluate for subclinical sepsis or hypoglycemia as a cause of her hypotension.  EKG and troponin to evaluate for possible cardiac ischemia as a cause of her syncopal episode.    Upon Reevaluation, the patient's condition has: Improved; and will be discharged.    Patient discharged from ED Observation status at 9:29 PM  (Time) 3/5/23 (Date).     INITIAL ASSESSMENT, COURSE AND PLAN  Care Narrative: This is a 89-year-old female who had a hypotensive episode followed by syncope while going to the bathroom prior to arrival.  The patient is on multiple antihypertensive medications and was found to have a low blood pressure at the time.  She could have had a vasovagal episode but stroke, cardiac ischemia, arrhythmia and sepsis are the differential diagnosis.  Labs have been ordered for the sepsis protocol as well as a CT head portable chest x-ray EKG and troponin to further evaluate her symptoms.  Currently on exam she has no focal neurologic deficits.  Her mucous membranes are somewhat dry.  The rest of her exam is unremarkable and she is alert awake and mentating well.  HYDRATION: Based on the patient's presentation of [place] OSULLIVAN the patient was  given IV fluids. IV Hydration was used because oral hydration was not adequate alone. Upon recheck following hydration, the patient was improved.      ADDITIONAL PROBLEM LIST  Atrial fibrillation  Hypertension  Diabetes  DISPOSITION AND DISCUSSIONS    The patient's CBC is unremarkable with a white count of 7.7 which is normal.  She is not anemic as her hemoglobin is 12 hematocrit 39.3.  Platelet count is 256.  Comprehensive metabolic panel has a slightly low sodium at 134.  Her glucose is elevated at 177.  BUN is elevated at 30 creatinine 1.01 indicating a small amount of dehydration.  Liver function tests are normal.  Patient's troponin is slightly elevated at 54.  Lactic acid is 1.5.  Urine is negative for infection.  Her EKG shows a paced rhythm is unchanged from prior EKGs.  X-ray does not show any infiltrates or evidence of congestive heart failure.  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  I mention this to the patient and family and they are aware of this finding and states that she has had CTs that have shown she has had a subdural in the past.  At this time the patient is stable [facility]  She actually had an elevated blood pressure while she was here and we gave her her night dose of lisinopril to help bring her blood pressure back to a more normal level.  The patient has tolerated food and fluid.  I did give her some IV fluid for rehydration which she tolerated well.  At this time I will [facility] and she is to follow-up with the primary care physician next week for recheck.  If she does develop any chest pain shortness of breath or other symptoms she should return to the emergency department.    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    Escalation of care considered, and ultimately not performed:acute inpatient care management, however at this time, the patient is most appropriate for outpatient management    Barriers to care at this time, including but not limited to: none.     Decision tools and prescription drugs considered including, but not limited to: NIH Stroke Scale 0 and HEART Score 4 due to patient's age and risk factors however EKG is not changed from prior.  Troponin is only slightly elevated.  She has not had any chest pains or shortness of breath in the emergency department.  Patient was given her night dose of lisinopril here in the emergency department  The patient will return for new or worsening symptoms and is stable at the time of discharge.    The patient is referred to a primary physician for blood pressure management, diabetic screening, and for all other preventative health concerns.      DISPOSITION:  Patient will be discharged home in stable condition.    FOLLOW UP:  your primary care    Call in 1 day  for recheck      OUTPATIENT MEDICATIONS:  New Prescriptions    No medications on file   Continue your regularly scheduled medications    FINAL DIAGNOSIS  1. Vasovagal syncope           Electronically signed by: Libia Cespedes M.D., 3/5/2023 4:28 PM

## 2023-03-07 LAB
BACTERIA UR CULT: ABNORMAL
BACTERIA UR CULT: ABNORMAL
SIGNIFICANT IND 70042: ABNORMAL
SITE SITE: ABNORMAL
SOURCE SOURCE: ABNORMAL

## 2023-03-09 NOTE — ED NOTES
ED Positive Culture Follow-up/Notification Note:    Date: 3/8/2023     Patient seen in the ED on 3/5/2023 for seizure and hypotension. History of diabetes. Patient did vomit prior to episode. Denies dysuria and hematuria. Afebrile with no leukocytosis and no CVA tenderness. Urinalysis is unremarkable. [facility] without [facility]bials.  1. Vasovagal syncope       Discharge Medication List as of 3/5/2023 10:47 PM          Allergies: Morphine and related [codeine], Levaquin, and Macrobid [nitrofurantoin]     Vitals:    03/05/23 2037 03/05/23 2039 03/05/23 2220 03/05/23 2241   BP: (!) 227/98 (!) 217/85 (!) 202/84 (!) 190/79   Pulse: 65 65 68 (!) 59   Resp: 17 16  13   Temp:    36.8 °C (98.2 °F)   TempSrc:       SpO2: 96% 94% 97% 99%   Weight:       Height:           Final cultures:   Results       Procedure Component Value Units Date/Time    URINE CULTURE(NEW) [[id number]]  (Abnormal)  (Susceptibility) Collected: 03/05/23 2035    Order Status: Completed Specimen: Urine Updated: 03/07/23 1103     Significant Indicator POS     Source UR     Site -     Culture Result Usual urogenital sunita 10-50,000 cfu/mL      Klebsiella pneumoniae ESBL  ,000 cfu/mL  Extended Spectrum Beta-lactamase (ESBL) isolated.  ESBL's may be clinically resistant to therapy with  Penicillins,Cephalosporins or Aztreonam despite  apparent in vitro susceptibility to some of these agents.  The patient requires contact isolation.  Please contact pharmacy or an Infectious Disease Specialist  if you have any questions about appropriate therapy.      Narrative:      Indication for culture:->Emergency Room Patient  Indication for culture:->Emergency Room Patient    Susceptibility       Klebsiella pneumoniae esbl (1)       Antibiotic Interpretation Microscan   Method Status    Amikacin  [*]  Sensitive <=16 mcg/mL VERONIQUE Final    Amoxicillin/CA  [*]  Sensitive <=8/4 mcg/mL VERONIQUE Final    Aztreonam  [*]  Resistant >16  "mcg/mL VERONIQUE Final    Ceftolozane+Tazobactam  [*]  Sensitive <=2 mcg/mL VERONIQUE Final    Ceftriaxone Resistant >32 mcg/mL VERONIQUE Final    Ceftazidime  [*]  Resistant >16 mcg/mL VERONIQUE Final    Cefazolin Resistant >16 mcg/mL VERONIQUE Final     Breakpoints when Cefazolin is used for therapy of infections  other than uncomplicated UTIs due to Enterobacterales are as  follows:  VERONIQUE and Interpretation:  <=2 S  4 I  >=8 R         Ciprofloxacin Intermediate 0.5 mcg/mL VERONIQUE Final    Cefepime Resistant >16 mcg/mL VERONIQUE Final    Cefuroxime Resistant >16 mcg/mL VERONIQUE Final    Ceftazidime+Avibactam  [*]  Sensitive <=4 mcg/mL VERONIQUE Final    Ampicillin/sulbactam Intermediate 16/8 mcg/mL VERONIQUE Final    Ertapenem  [*]  Sensitive <=0.5 mcg/mL VERONIQUE Final    Tobramycin Sensitive <=2 mcg/mL VERONIQUE Final    Nitrofurantoin Intermediate 64 mcg/mL VERONIQUE Final    Gentamicin Sensitive <=2 mcg/mL VERONIQUE Final    Imipenem  [*]  Sensitive <=1 mcg/mL VERONIQUE Final    Levofloxacin Sensitive <=0.5 mcg/mL VERONIQUE Final    Meropenem  [*]  Sensitive <=1 mcg/mL VERONIQUE Final    Meropenem/Vaborbactam  [*]  Sensitive <=2 mcg/mL VERONIQUE Final    Minocycline Sensitive <=4 mcg/mL VERONIQUE Final    Pip/Tazobactam Sensitive <=8 mcg/mL VERONIQUE Final    Trimeth/Sulfa Sensitive <=0.5/9.5 mcg/mL VERONIQUE Final    Tetracycline  [*]  Sensitive <=4 mcg/mL VERONIQUE Final    Tigecycline Sensitive <=2 mcg/mL VERONIQUE Final               [*]  Suppressed Antibiotic                   BLOOD CULTURE [[id number]] Collected: 03/05/23 1708    Order Status: Completed Specimen: Blood from Peripheral Updated: 03/06/23 0740     Significant Indicator NEG     Source BLD     Site PERIPHERAL     Culture Result No Growth  Note: Blood cultures are incubated for 5 days and  are monitored continuously.Positive blood cultures  are called to the RN and reported as soon as  they are identified.      Narrative:      Per Hospital Policy: Only change Specimen Src: to \"Line\" if  specified by physician order.  Right AC    BLOOD CULTURE [[id number]] Collected: 03/05/23 " "1750    Order Status: Completed Specimen: Blood from Peripheral Updated: 03/06/23 0740     Significant Indicator NEG     Source BLD     Site PERIPHERAL     Culture Result No Growth  Note: Blood cultures are incubated for 5 days and  are monitored continuously.Positive blood cultures  are called to the RN and reported as soon as  they are identified.      Narrative:      Per Hospital Policy: Only change Specimen Src: to \"Line\" if  specified by physician order.  Right Hand    URINALYSIS [[id number]] Collected: 03/05/23 2035    Order Status: Completed Specimen: Urine Updated: 03/05/23 2052     Color Yellow     Character Clear     Specific Gravity 1.013     Ph 5.0     Glucose Negative mg/dL      Ketones Negative mg/dL      Protein Negative mg/dL      Bilirubin Negative     Urobilinogen, Urine 0.2     Nitrite Negative     Leukocyte Esterase Negative     Occult Blood Negative     Micro Urine Req see below     Comment: Microscopic examination not performed when specimen is clear  and chemically negative for protein, blood, leukocyte esterase  and nitrite.         Narrative:      Indication for culture:->Emergency Room Patient            Plan:   Urine culture grew ESBL Klebsiella pneumoniae. Patient has no urinary symptoms and no UTI diagnosis. This result represents asymptomatic bacteriuria and no treatment is indicated. No changes required based upon culture result.    Yoseph Lizama, PharmD   "

## 2023-03-10 LAB
BACTERIA BLD CULT: NORMAL
BACTERIA BLD CULT: NORMAL
SIGNIFICANT IND 70042: NORMAL
SIGNIFICANT IND 70042: NORMAL
SITE SITE: NORMAL
SITE SITE: NORMAL
SOURCE SOURCE: NORMAL
SOURCE SOURCE: NORMAL

## 2023-06-03 ENCOUNTER — HOSPITAL ENCOUNTER (EMERGENCY)
Facility: MEDICAL CENTER | Age: 88
End: 2023-06-03
Payer: MEDICARE

## 2023-06-13 NOTE — ED PROVIDER NOTES
"ED Provider Note    Scribed for Levar Koenig M.D. by Norma Rizzo. 7/28/2017, 6:45 PM.    Primary care provider: Mark Sommers M.D.  Means of arrival: Ambulance  History obtained from: Patient and Family  History limited by: None    CHIEF COMPLAINT  Chief Complaint   Patient presents with   • T-5000 Ankle Injury     Left    • Other     [facility] \"trimal[facility]olar fracture left ankle, metatarsal fracture left, UTI, asymptomatic hyponatremia\".       HPI  Deanna Ram is a 86 y.o. female who presents to the Emergency Department as a transfer from [place] for a fall onset earlier today around 0430. She states that while she was at [place], she had X-rays done that revealed fractures they thought would be able to heal on their own with a boot. When she returned home after her visit, she was informed that there were more complex breakages than previously thought. [facility] only to find out that they did not have an orthopedist. The family notes that the patient is being treated for a urinary tract infection at this time and has had one day of treatment with Bactrim. She was also hospitalized for three days last week for hypertension and associated slurred speech. The patient was then transferred to [facility] for further orthopedic evaluation.    REVIEW OF SYSTEMS  Review of Systems   Constitutional: Negative for fever and chills.   Respiratory: Negative for cough and shortness of breath.    Cardiovascular: Negative for chest pain.   Musculoskeletal: Positive for falls.        +Left ankle fractures   All other systems reviewed and are negative.  E    PAST MEDICAL HISTORY   has a past medical history of Hypertension; Type II or unspecified type diabetes mellitus without mention of complication, not stated as uncontrolled; Hypercholesteremia; Poor historian; Chronic atrial fibrillation (CMS-[facility]); and Osteoporosis.Atrial fibrillation  Denies history of MI    SURGICAL " No, I need specifics on symptoms.  Mary Cleary, NP     "HISTORY   has past surgical history that includes mastectomy (left); lobectomy; tonsillectomy and adenoidectomy; mastoidectomy; appendectomy; and cholecystectomy.    SOCIAL HISTORY  Social History   Substance Use Topics   • Smoking status: Former Smoker   • Smokeless tobacco: Never Used   • Alcohol Use: Yes      Comment: rare      History   Drug Use No       FAMILY HISTORY  No pertinent family history     CURRENT MEDICATIONS    No current facility-administered medications on file prior to encounter.     Current Outpatient Prescriptions on File Prior to Encounter   Medication Sig Dispense Refill   • HYDROmorphone (DILAUDID) 2 MG Tab Take 0.5-2 Tabs by mouth every 6 hours as needed for Severe Pain. 7 Tab 0   • sulfamethoxazole-trimethoprim (BACTRIM DS) 800-160 MG tablet Take 1 Tab by mouth 2 times a day for 5 days. 10 Tab 0   • alendronate (FOSAMAX) 70 MG Tab Take 70 mg by mouth every Monday.     • metformin (GLUCOPHAGE) 1000 MG tablet Take 1,000 mg by mouth every morning.     • glipiZIDE (GLUCOTROL) 5 MG Tab Take 5 mg by mouth every morning.     • pravastatin (PRAVACHOL) 40 MG tablet Take 40 mg by mouth every evening.         ALLERGIES  Allergies   Allergen Reactions   • Codeine Nausea   • Levaquin Rash     Patient can't remember what happens when she takes it.         PHYSICAL EXAM  VITAL SIGNS: /65 mmHg  Pulse 85  Temp(Src) 36.5 °C (97.7 °F)  Resp 16  Ht 1.549 m (5' 1\")  Wt 58.514 kg (129 lb)  BMI 24.39 kg/m2  SpO2 99  Constitutional: Well developed, Well nourished, No acute distress, Non-toxic appearance. Elderly  female   HENT: Normocephalic, Atraumatic, Bilateral external ears normal, oropharynx moist, No oral exudates, Nose normal.   Eyes: Pupils are equal round and react to light, extraocular motions are intact, conjunctiva is normal, there are no signs of exudate.   Neck: Supple, no meningeal signs.  Lymphatic: No lymphadenopathy noted.   Cardiovascular: ? Irregular rate without murmurs " gallops or rubs.   Thorax & Lungs: No respiratory distress. Breathing comfortably. Lungs are clear but diminished to auscultation bilaterally, there are no wheezes no rales. Chest wall is nontender.  Abdomen: Soft, nontender, nondistended. Bowel sounds are present.   Skin: Warm, Dry, No erythema,   Back: No tenderness, No CVA tenderness.  Musculoskeletal: Good range of motion in all major joints. No tenderness to palpation or major deformities noted. Intact distal pulses, no clubbing, no cyanosis, no edema, Left lower extremity: In cast, able to move toes, sensation intact distally  Neurologic: Alert & oriented x 3, Moving all extremities. No gross abnormalities.    Psychiatric: Affect normal, Judgment normal, Mood normal.     LABS  Results for orders placed or performed during the hospital encounter of 07/28/17   CBC with Differential   Result Value Ref Range    WBC 10.3 4.8 - 10.8 K/uL    RBC 4.17 (L) 4.20 - 5.40 M/uL    Hemoglobin 12.1 12.0 - 16.0 g/dL    Hematocrit 35.0 (L) 37.0 - 47.0 %    MCV 83.9 81.4 - 97.8 fL    MCH 29.0 27.0 - 33.0 pg    MCHC 34.6 33.6 - 35.0 g/dL    RDW 45.7 35.9 - 50.0 fL    Platelet Count 371 164 - 446 K/uL    MPV 8.8 (L) 9.0 - 12.9 fL    Neutrophils-Polys 74.30 (H) 44.00 - 72.00 %    Lymphocytes 12.60 (L) 22.00 - 41.00 %    Monocytes 10.90 0.00 - 13.40 %    Eosinophils 1.20 0.00 - 6.90 %    Basophils 0.30 0.00 - 1.80 %    Immature Granulocytes 0.70 0.00 - 0.90 %    Nucleated RBC 0.00 /100 WBC    Neutrophils (Absolute) 7.69 (H) 2.00 - 7.15 K/uL    Lymphs (Absolute) 1.30 1.00 - 4.80 K/uL    Monos (Absolute) 1.13 (H) 0.00 - 0.85 K/uL    Eos (Absolute) 0.12 0.00 - 0.51 K/uL    Baso (Absolute) 0.03 0.00 - 0.12 K/uL    Immature Granulocytes (abs) 0.07 0.00 - 0.11 K/uL    NRBC (Absolute) 0.00 K/uL   Complete Metabolic Panel (CMP)   Result Value Ref Range    Sodium 121 (L) 135 - 145 mmol/L    Potassium 3.8 3.6 - 5.5 mmol/L    Chloride 89 (L) 96 - 112 mmol/L    Co2 22 20 - 33 mmol/L    Anion  Gap 10.0 0.0 - 11.9    Glucose 151 (H) 65 - 99 mg/dL    Bun 14 8 - 22 mg/dL    Creatinine 0.97 0.50 - 1.40 mg/dL    Calcium 8.6 8.5 - 10.5 mg/dL    AST(SGOT) 16 12 - 45 U/L    ALT(SGPT) 15 2 - 50 U/L    Alkaline Phosphatase 51 30 - 99 U/L    Total Bilirubin 0.3 0.1 - 1.5 mg/dL    Albumin 3.4 3.2 - 4.9 g/dL    Total Protein 6.7 6.0 - 8.2 g/dL    Globulin 3.3 1.9 - 3.5 g/dL    A-G Ratio 1.0 g/dL   Btype Natriuretic Peptide (BNP)   Result Value Ref Range    B Natriuretic Peptide 195 (H) 0 - 100 pg/mL   Prothrombin Time (PT/INR)   Result Value Ref Range    PT 13.5 12.0 - 14.6 sec    INR 1.00 0.87 - 1.13   APTT   Result Value Ref Range    APTT 34.9 24.7 - 36.0 sec   Lipase   Result Value Ref Range    Lipase 24 11 - 82 U/L   Troponin STAT   Result Value Ref Range    Troponin I <0.01 0.00 - 0.04 ng/mL   ESTIMATED GFR   Result Value Ref Range    GFR If African American >60 >60 mL/min/1.73 m 2    GFR If Non African American 54 (A) >60 mL/min/1.73 m 2   ACCU-CHEK GLUCOSE   Result Value Ref Range    Glucose - Accu-Ck 182 (H) 65 - 99 mg/dL   EKG (ER)   Result Value Ref Range    Report       [facility] Emergency Dept.    Test Date:  2017  Pt Name:    CLARITA CARRIZALES                Department: ER  MRN:        [id number]                      Room:        18  Gender:     F                            Technician: [id number]  :        [id number]                   Requested By:FRANKI MONTENEGRO  Order #:    [id number]                    Reading MD: FRANKI MONTENEGRO MD    Measurements  Intervals                                Axis  Rate:       89                           P:  DE:                                      QRS:        21  QRSD:       76                           T:          50  QT:         388  QTc:        473    Interpretive Statements  ATRIAL FIBRILLATION  LATE PRECORDIAL R/S TRANSITION  BASELINE WANDER IN LEAD(S) V3,V5  No previous ECG available for comparison    Electronically Signed On 2017  19:56:00 PDT by FRANKI MONTENEGRO MD         All labs reviewed by me.    EKG  Interpreted by me    As above    RADIOLOGY  DX-CHEST-PORTABLE (1 VIEW)   Final Result   Addendum 1 of 1   Addendum:   History:  Chest pain and shortness of breath.      Final      1.  There is no acute cardiopulmonary process.   2.  Rounded density right hilar region which could be a prominent vessel. Underlying mass however is not completely excluded.      [id number] FILM X-RAY   Preliminary Result      [id number] FILM X-RAY   Preliminary Result        Ankle X Ray  Impression        Trimalleolar fracture of the ankle as above.    As only the distal fibular fracture is identified by the ER physician the final results were called to the ER at the time of this dictation.       The radiologist's interpretation of all radiological studies have been reviewed by me.    COURSE & MEDICAL DECISION MAKING  Pertinent Labs & Imaging studies reviewed. (See chart for details)    6:45 PM - Patient seen and examined at bedside.The images of the patient's radiology studies were independently reviewed by me. I informed the patient's family that she would likely be treated tomorrow secondary to the fact that she ate less than 2 hours ago. Ordered Chest X-ray, CBC, CMP, BNP, PT, APTT, Lipase, Troponin, EKG. Treated with Dilaudid 0.5mg IV, Zofran 4mg IV.     7:02 PM Paged Ortho.     7:15 PM Spoke with Rhianna Nobles, about the patient's condition. He is aware of the patient and will consult.     Decision Making:  Patient presents because of a trimalleolar fracture. The patient had chest x-ray, laboratory studies for preoperative purposes. There is a masses noted. This is incidental in nature. At this point, I spoke with Dr. Cota. She will violate the patient for operative repair of the trimalleolar fracture spoke to hospitalist for this admission. Medical clearance.    DISPOSITION:  Patient will be admitted to  in guarded  condition.    FINAL IMPRESSION  1. Ankle fracture, left, closed, initial encounter    2. Permanent atrial fibrillation (CMS-HCC)    3. Acute cystitis without hematuria          Norma MCCAULEY (Yajaira), am scribing for, and in the presence of, Levar Koenig M.D..    Electronically signed by: Norma Rizzo (Yajaira), 7/28/2017    ILevar M.D. personally performed the services described in this documentation, as scribed by Norma Rizzo in my presence, and it is both accurate and complete.    The note accurately reflects work and decisions made by me.  Levar Koenig  7/28/2017  11:04 PM

## (undated) DEVICE — SET EXTENSION WITH 2 PORTS (48EA/CA) ***PART #2C8610 IS A SUBSTITUTE*****

## (undated) DEVICE — CHLORAPREP 26 ML APPLICATOR - ORANGE TINT(25/CA)

## (undated) DEVICE — GLOVE BIOGEL PI ORTHO SZ 7 PF LF (40PR/BX)

## (undated) DEVICE — TUBING CLEARLINK DUO-VENT - C-FLO (48EA/CA)

## (undated) DEVICE — PROTECTOR ULNA NERVE - (36PR/CA)

## (undated) DEVICE — SPLINT PLASTER 5 IN X 30 IN - (50EA/BX 6BX/CA)

## (undated) DEVICE — SODIUM CHL IRRIGATION 0.9% 1000ML (12EA/CA)

## (undated) DEVICE — ELECTRODE 850 FOAM ADHESIVE - HYDROGEL RADIOTRNSPRNT (50/PK)

## (undated) DEVICE — GLOVE BIOGEL INDICATOR SZ 7.5 SURGICAL PF LTX - (50PR/BX 4BX/CA)

## (undated) DEVICE — GOWN WARMING STANDARD FLEX - (30/CA)

## (undated) DEVICE — DRILL BIT 2.5 TWIST 310.25 (8TX2+1TX3=19)

## (undated) DEVICE — PACK LOWER EXTREMITY - (2/CA)

## (undated) DEVICE — SUTURE 3-0 ETHILON FS-1 - (36/BX) 30 INCH

## (undated) DEVICE — SUTURE 2-0 VICRYL PLUS CT-1 36 (36PK/BX)"

## (undated) DEVICE — DRESSING XEROFORM 1X8 - (50/BX 4BX/CA)

## (undated) DEVICE — SUCTION INSTRUMENT YANKAUER BULBOUS TIP W/O VENT (50EA/CA)

## (undated) DEVICE — SENSOR SPO2 NEO LNCS ADHESIVE (20/BX) SEE USER NOTES

## (undated) DEVICE — HEAD HOLDER JUNIOR/ADULT

## (undated) DEVICE — COTTON ROLL 1 POUND BIOSEAL - (5RL/CA)

## (undated) DEVICE — NEPTUNE 4 PORT MANIFOLD - (20/PK)

## (undated) DEVICE — DETERGENT RENUZYME PLUS 10 OZ PACKET (50/BX)

## (undated) DEVICE — KIT ROOM DECONTAMINATION

## (undated) DEVICE — KIT ANESTHESIA W/CIRCUIT & 3/LT BAG W/FILTER (20EA/CA)

## (undated) DEVICE — ELECTRODE DUAL RETURN W/ CORD - (50/PK)

## (undated) DEVICE — SUTURE GENERAL

## (undated) DEVICE — Device

## (undated) DEVICE — DRAPE 36X28IN RAD CARM BND BG - (25/CA) O

## (undated) DEVICE — PADDING CAST 6 IN STERILE - 6 X 4 YDS (24/CA)

## (undated) DEVICE — DRAPE LARGE 3 QUARTER - (20/CA)

## (undated) DEVICE — DRILL BIT 2.0 LONG 310.21 - (6TX212) SDSTB=2

## (undated) DEVICE — CANISTER SUCTION 3000ML MECHANICAL FILTER AUTO SHUTOFF MEDI-VAC NONSTERILE LF DISP  (40EA/CA)

## (undated) DEVICE — GLOVE SZ 7.5 BIOGEL PI MICRO - PF LF (50PR/BX)

## (undated) DEVICE — GUIDE WIRE 1.25 X 150MM SMTH - (4CSX6=24)

## (undated) DEVICE — LACTATED RINGERS INJ 1000 ML - (14EA/CA 60CA/PF)

## (undated) DEVICE — DRAPE U ORTHOPEDIC - (10/BX)

## (undated) DEVICE — STAPLER SKIN DISP - (6/BX 10BX/CA) VISISTAT

## (undated) DEVICE — WRAP COBAN SELF-ADHERENT 6 IN X  5YDS STERILE TAN (12/CA)

## (undated) DEVICE — BOVIE BLADE COATED - (50/PK)

## (undated) DEVICE — LEAD SET 6 DISP. EKG NIHON KOHDEN (100EA/CA) [9859].

## (undated) DEVICE — GLOVE BIOGEL INDICATOR SZ 6.5 SURGICAL PF LTX - (50PR/BX 4BX/CA)

## (undated) DEVICE — DRILL BIT 2.7 125MM 315.28 (5TX2+1TX1=11)

## (undated) DEVICE — BANDAGE ELASTIC 6 HONEYCOMB - 6X5YD LF (20/CA)"

## (undated) DEVICE — SUTURE 0 VICRYL PLUS CT-1 - 36 INCH (36/BX)

## (undated) DEVICE — MASK ANESTHESIA ADULT  - (100/CA)

## (undated) DEVICE — BANDAGE ELASTIC STERILE MATRIX 6 X 10 (20EA/CA)

## (undated) DEVICE — GLOVE BIOGEL PI INDICATOR SZ 8.0 SURGICAL PF LF -(50/BX 4BX/CA)

## (undated) DEVICE — DRAPE C-ARM LARGE 41IN X 74 IN - (10/BX 2BX/CA)

## (undated) DEVICE — SET LEADWIRE 5 LEAD BEDSIDE DISPOSABLE ECG (1SET OF 5/EA)

## (undated) DEVICE — GUIDE WIRE 1.25 X 150MM THRD - (4CSX6=24)

## (undated) DEVICE — GLOVE BIOGEL SZ 6.5 SURGICAL PF LTX (50PR/BX 4BX/CA)

## (undated) DEVICE — PAD LAP STERILE 18 X 18 - (5/PK 40PK/CA)

## (undated) DEVICE — DRAPE C ARMOR (12EA/CA)

## (undated) DEVICE — DRAPE SURG STERI-DRAPE 7X11OD - (40EA/CA)